# Patient Record
Sex: FEMALE | Race: OTHER | Employment: FULL TIME | URBAN - METROPOLITAN AREA
[De-identification: names, ages, dates, MRNs, and addresses within clinical notes are randomized per-mention and may not be internally consistent; named-entity substitution may affect disease eponyms.]

---

## 2019-08-16 ENCOUNTER — OFFICE VISIT (OUTPATIENT)
Dept: INTERNAL MEDICINE CLINIC | Facility: CLINIC | Age: 39
End: 2019-08-16
Payer: COMMERCIAL

## 2019-08-16 ENCOUNTER — LAB ENCOUNTER (OUTPATIENT)
Dept: LAB | Facility: HOSPITAL | Age: 39
End: 2019-08-16
Attending: NURSE PRACTITIONER
Payer: COMMERCIAL

## 2019-08-16 VITALS
SYSTOLIC BLOOD PRESSURE: 139 MMHG | BODY MASS INDEX: 30.6 KG/M2 | HEART RATE: 69 BPM | HEIGHT: 65 IN | DIASTOLIC BLOOD PRESSURE: 87 MMHG | WEIGHT: 183.69 LBS

## 2019-08-16 DIAGNOSIS — Z00.00 ANNUAL PHYSICAL EXAM: Primary | ICD-10-CM

## 2019-08-16 DIAGNOSIS — E55.9 VITAMIN D DEFICIENCY: ICD-10-CM

## 2019-08-16 DIAGNOSIS — Z00.00 ANNUAL PHYSICAL EXAM: ICD-10-CM

## 2019-08-16 DIAGNOSIS — J34.89 SINUS PRESSURE: ICD-10-CM

## 2019-08-16 LAB
25(OH)D3 SERPL-MCNC: 15.4 NG/ML (ref 30–100)
ALBUMIN SERPL-MCNC: 3.7 G/DL (ref 3.4–5)
ALBUMIN/GLOB SERPL: 0.8 {RATIO} (ref 1–2)
ALP LIVER SERPL-CCNC: 92 U/L (ref 37–98)
ALT SERPL-CCNC: 17 U/L (ref 13–56)
ANION GAP SERPL CALC-SCNC: 9 MMOL/L (ref 0–18)
AST SERPL-CCNC: 17 U/L (ref 15–37)
BASOPHILS # BLD AUTO: 0.03 X10(3) UL (ref 0–0.2)
BASOPHILS NFR BLD AUTO: 0.3 %
BILIRUB SERPL-MCNC: 0.3 MG/DL (ref 0.1–2)
BUN BLD-MCNC: 9 MG/DL (ref 7–18)
BUN/CREAT SERPL: 12.3 (ref 10–20)
CALCIUM BLD-MCNC: 8.5 MG/DL (ref 8.5–10.1)
CHLORIDE SERPL-SCNC: 105 MMOL/L (ref 98–112)
CHOLEST SMN-MCNC: 197 MG/DL (ref ?–200)
CO2 SERPL-SCNC: 25 MMOL/L (ref 21–32)
CREAT BLD-MCNC: 0.73 MG/DL (ref 0.55–1.02)
DEPRECATED RDW RBC AUTO: 44.9 FL (ref 35.1–46.3)
EOSINOPHIL # BLD AUTO: 0.34 X10(3) UL (ref 0–0.7)
EOSINOPHIL NFR BLD AUTO: 3.8 %
ERYTHROCYTE [DISTWIDTH] IN BLOOD BY AUTOMATED COUNT: 14.6 % (ref 11–15)
EST. AVERAGE GLUCOSE BLD GHB EST-MCNC: 111 MG/DL (ref 68–126)
GLOBULIN PLAS-MCNC: 4.4 G/DL (ref 2.8–4.4)
GLUCOSE BLD-MCNC: 90 MG/DL (ref 70–99)
HBA1C MFR BLD HPLC: 5.5 % (ref ?–5.7)
HCT VFR BLD AUTO: 41.3 % (ref 35–48)
HDLC SERPL-MCNC: 22 MG/DL (ref 40–59)
HGB BLD-MCNC: 13.2 G/DL (ref 12–16)
IMM GRANULOCYTES # BLD AUTO: 0.03 X10(3) UL (ref 0–1)
IMM GRANULOCYTES NFR BLD: 0.3 %
LDLC SERPL CALC-MCNC: 122 MG/DL (ref ?–100)
LYMPHOCYTES # BLD AUTO: 3.04 X10(3) UL (ref 1–4)
LYMPHOCYTES NFR BLD AUTO: 34.1 %
M PROTEIN MFR SERPL ELPH: 8.1 G/DL (ref 6.4–8.2)
MCH RBC QN AUTO: 27.2 PG (ref 26–34)
MCHC RBC AUTO-ENTMCNC: 32 G/DL (ref 31–37)
MCV RBC AUTO: 85 FL (ref 80–100)
MONOCYTES # BLD AUTO: 0.62 X10(3) UL (ref 0.1–1)
MONOCYTES NFR BLD AUTO: 7 %
NEUTROPHILS # BLD AUTO: 4.85 X10 (3) UL (ref 1.5–7.7)
NEUTROPHILS # BLD AUTO: 4.85 X10(3) UL (ref 1.5–7.7)
NEUTROPHILS NFR BLD AUTO: 54.5 %
NONHDLC SERPL-MCNC: 175 MG/DL (ref ?–130)
OSMOLALITY SERPL CALC.SUM OF ELEC: 286 MOSM/KG (ref 275–295)
PATIENT FASTING: YES
PATIENT FASTING: YES
PLATELET # BLD AUTO: 314 10(3)UL (ref 150–450)
POTASSIUM SERPL-SCNC: 3.9 MMOL/L (ref 3.5–5.1)
RBC # BLD AUTO: 4.86 X10(6)UL (ref 3.8–5.3)
SODIUM SERPL-SCNC: 139 MMOL/L (ref 136–145)
TRIGL SERPL-MCNC: 266 MG/DL (ref 30–149)
TSI SER-ACNC: 2.38 MIU/ML (ref 0.36–3.74)
VLDLC SERPL CALC-MCNC: 53 MG/DL (ref 0–30)
WBC # BLD AUTO: 8.9 X10(3) UL (ref 4–11)

## 2019-08-16 PROCEDURE — 36415 COLL VENOUS BLD VENIPUNCTURE: CPT

## 2019-08-16 PROCEDURE — 82306 VITAMIN D 25 HYDROXY: CPT

## 2019-08-16 PROCEDURE — 85025 COMPLETE CBC W/AUTO DIFF WBC: CPT

## 2019-08-16 PROCEDURE — 83036 HEMOGLOBIN GLYCOSYLATED A1C: CPT

## 2019-08-16 PROCEDURE — 80061 LIPID PANEL: CPT

## 2019-08-16 PROCEDURE — 99385 PREV VISIT NEW AGE 18-39: CPT | Performed by: NURSE PRACTITIONER

## 2019-08-16 PROCEDURE — 80053 COMPREHEN METABOLIC PANEL: CPT

## 2019-08-16 PROCEDURE — 84443 ASSAY THYROID STIM HORMONE: CPT

## 2019-08-16 NOTE — ASSESSMENT & PLAN NOTE
Mild Sinus Pressure on exam.  Discussed high allergy season  1) Push fluids  2) Zyretic 10 mg po daily

## 2019-08-16 NOTE — ASSESSMENT & PLAN NOTE
A/P-Here for annual physical.  She has a mild tenderness over her maxillary sinuses it is high allergy season and we will try Zyrtec 10 mg daily. She is healthy but has not seen a gynecologist also since her daughter's birth which was 5 years ago.   Is due

## 2019-08-16 NOTE — PROGRESS NOTES
HPI:    Patient ID: Liana Ornelas is a 44year old female. LMP-August 7th. Not sexualyl active. Last time she had sex was 4 years ago. HPI  Patient here for physical exam.  She has not seen a physician in a while.   Her daughter is 11years old and s Tab Take 1 tablet by mouth every 7 days. Disp: 12 tablet Rfl: 1     Allergies:No Known Allergies   PHYSICAL EXAM:   Physical Exam    Constitutional: She is oriented to person, place, and time. She appears well-developed and well-nourished.    HENT:   Head: No follow-ups on file.     Orders Placed This Encounter      CBC diff      CMP      Lipid Panel [E]      TSH reflex      Vitamin D, 25-Hydroxy [E]      Hemoglobin A1C [E]      Meds This Visit:  Requested Prescriptions     Signed Prescriptions Disp Ref

## 2019-08-16 NOTE — ASSESSMENT & PLAN NOTE
Vitamin D Deficiency- Lab drawn today Vitamin D 15.4.  Patient called with Results  1) Vitamin D 50,000 units once a week for 12 weeks  2) Repeat Vitamin D level in 3 months

## 2019-08-21 NOTE — PROGRESS NOTES
Zachary Vicki    Your Vitamin D level is low. Prescription has been sent for Vitamin D 50,000 units once a week for 12 weeks  Hemoglobin A 1 C  Monitors glucose level over the past 3 months. This is normal.  Cholesterol is under 200. This is good.   HD

## 2019-09-09 ENCOUNTER — LAB ENCOUNTER (OUTPATIENT)
Dept: LAB | Facility: HOSPITAL | Age: 39
End: 2019-09-09
Attending: NURSE PRACTITIONER
Payer: COMMERCIAL

## 2019-09-09 DIAGNOSIS — Z00.00 ROUTINE GENERAL MEDICAL EXAMINATION AT A HEALTH CARE FACILITY: Primary | ICD-10-CM

## 2019-09-09 PROCEDURE — 93005 ELECTROCARDIOGRAM TRACING: CPT

## 2019-09-09 PROCEDURE — 93010 ELECTROCARDIOGRAM REPORT: CPT | Performed by: NURSE PRACTITIONER

## 2019-09-11 ENCOUNTER — TELEPHONE (OUTPATIENT)
Dept: INTERNAL MEDICINE CLINIC | Facility: CLINIC | Age: 39
End: 2019-09-11

## 2019-09-25 ENCOUNTER — TELEPHONE (OUTPATIENT)
Dept: INTERNAL MEDICINE CLINIC | Facility: CLINIC | Age: 39
End: 2019-09-25

## 2019-09-25 NOTE — TELEPHONE ENCOUNTER
Spoke with patient ( verified) and reports no relief from Zyrtec as ordered by CECILIA Medrano at 19 OV. \"It doesn't really feel like sinus or allergies. It's more like a migraine.  When the headaches come, they're really, really strong--I get dizzy--t

## 2019-09-25 NOTE — TELEPHONE ENCOUNTER
Patient called in stating that she was prescribed this medication below for headache. She did not get relief from the headaches while taking the medication. She is currently out of the medication and isn't sure if she needs a refill. Please advise.

## 2019-09-26 NOTE — TELEPHONE ENCOUNTER
Instruct her to take Excedrin Migraine two tabs x1 in 24 hours. If headaches not resolved please make OV.

## 2019-10-04 ENCOUNTER — OFFICE VISIT (OUTPATIENT)
Dept: OBGYN CLINIC | Facility: CLINIC | Age: 39
End: 2019-10-04
Payer: COMMERCIAL

## 2019-10-04 VITALS
SYSTOLIC BLOOD PRESSURE: 142 MMHG | HEART RATE: 111 BPM | DIASTOLIC BLOOD PRESSURE: 96 MMHG | BODY MASS INDEX: 32 KG/M2 | WEIGHT: 190.38 LBS

## 2019-10-04 DIAGNOSIS — Z12.31 ENCOUNTER FOR SCREENING MAMMOGRAM FOR BREAST CANCER: ICD-10-CM

## 2019-10-04 DIAGNOSIS — Z80.3 FAMILY HISTORY OF BREAST CANCER: ICD-10-CM

## 2019-10-04 DIAGNOSIS — Z01.419 ENCOUNTER FOR GYNECOLOGICAL EXAMINATION WITHOUT ABNORMAL FINDING: Primary | ICD-10-CM

## 2019-10-04 DIAGNOSIS — Z12.4 CERVICAL CANCER SCREENING: ICD-10-CM

## 2019-10-04 PROCEDURE — 99385 PREV VISIT NEW AGE 18-39: CPT | Performed by: OBSTETRICS & GYNECOLOGY

## 2019-10-04 NOTE — PROGRESS NOTES
Well Woman Exam    HPI:  The patient is a 42yo female who presents today for annual exam. She has no complaints at this time. She is not using contraception and does not desire anything at this time. Her sister was diagnosed in her 35s with breast cancer. Emotionally abused: Not on file        Physically abused: Not on file        Forced sexual activity: Not on file    Other Topics      Concerns:        Not on file    Social History Narrative      Not on file      FAMILY HISTORY:  Family History   Problem R appearance without lesions, no abnormal discharge  Cervix:  Normal without tenderness on motion  Uterus: normal in size, contour, position, mobility, without tenderness  Adnexa: normal without masses or tenderness  Perineum: normal    Assessment/Plan:  1.

## 2020-04-20 ENCOUNTER — NURSE TRIAGE (OUTPATIENT)
Dept: INTERNAL MEDICINE CLINIC | Facility: CLINIC | Age: 40
End: 2020-04-20

## 2020-04-20 ENCOUNTER — VIRTUAL PHONE E/M (OUTPATIENT)
Dept: INTERNAL MEDICINE CLINIC | Facility: CLINIC | Age: 40
End: 2020-04-20
Payer: COMMERCIAL

## 2020-04-20 DIAGNOSIS — G43.C0 PERIODIC HEADACHE SYNDROME, NOT INTRACTABLE: ICD-10-CM

## 2020-04-20 DIAGNOSIS — R21 RASH: Primary | ICD-10-CM

## 2020-04-20 PROCEDURE — 99213 OFFICE O/P EST LOW 20 MIN: CPT | Performed by: NURSE PRACTITIONER

## 2020-04-20 RX ORDER — SUMATRIPTAN 25 MG/1
25 TABLET, FILM COATED ORAL EVERY 2 HOUR PRN
Qty: 9 TABLET | Refills: 1 | Status: SHIPPED | OUTPATIENT
Start: 2020-04-20

## 2020-04-20 NOTE — TELEPHONE ENCOUNTER
Action Requested: Summary for Provider     []  Critical Lab, Recommendations Needed  [x] Need Additional Advice  []   FYI    []   Need Orders  [] Need Medications Sent to Pharmacy  []  Other     SUMMARY: Patient has had a red, raised rash on her neck, ches

## 2020-04-20 NOTE — PROGRESS NOTES
HPI:    Patient ID: Jaleesa Wahl is a 36year old female. HPI Presents with Rash (ITCHY)  36year old female with complains of itchy rash on her chest,back, and legs. The rash started on Friday and it is very itchy.  Small red, raised papules scatt Current Outpatient Medications   Medication Sig Dispense Refill   • hydrocortisone 2.5 % External Cream Apply 1 Application topically 2 (two) times daily.  20 g 0   • SUMAtriptan Succinate (IMITREX) 25 MG Oral Tab Take 1 tablet (25 mg total) by mouth every A/P-36year-old female who I saw her in August for migraine headaches continues to periodically have these headaches. . She currently does not have 1 at the moment but when she gets them light and noise bothers her.   She has to lay down in a dark room and

## 2020-04-21 NOTE — ASSESSMENT & PLAN NOTE
A/P-36year-old female who I saw her in August for migraine headaches. She continues to periodically have these headaches. . She currently does not have 1 at the moment but when she gets them light and noise bothers her.   She has to lay down in a dark room

## 2020-04-21 NOTE — ASSESSMENT & PLAN NOTE
A/P 70-year-old female who I had seen last August for migraine headaches. She called today because she had a rash that erupted on Friday evening. It is very itchy and there are dry raised 1 mm papules on her chest, back and arms.     I was able to do a vi

## 2020-04-23 ENCOUNTER — TELEPHONE (OUTPATIENT)
Dept: INTERNAL MEDICINE CLINIC | Facility: CLINIC | Age: 40
End: 2020-04-23

## 2020-04-23 NOTE — TELEPHONE ENCOUNTER
Please advise on next step:      The patient had a virtual call on 4/20/20. The patient stated the rash is now her hands, a couple on her legs, back and chest.  It is spreading. The skin feels rough and is falling off.   It starts a little red and get

## 2020-04-23 NOTE — TELEPHONE ENCOUNTER
Follow call. Patient with headache and rash. She now has more eruptions on her chest and under her breast. She was able to show me via video. The rash on her breast looks like urticaria with wheals. She is still having papules on arms and legs.  She was he

## 2020-04-24 ENCOUNTER — TELEPHONE (OUTPATIENT)
Dept: INTERNAL MEDICINE CLINIC | Facility: CLINIC | Age: 40
End: 2020-04-24

## 2020-04-24 RX ORDER — DOXYCYCLINE HYCLATE 100 MG/1
100 CAPSULE ORAL 2 TIMES DAILY
Qty: 14 CAPSULE | Refills: 0 | Status: SHIPPED | OUTPATIENT
Start: 2020-04-24

## 2020-04-24 NOTE — TELEPHONE ENCOUNTER
Please see pharmacy message below;  Rx pended for capsules, for review/approval (with note explaining to pharmacy is to replace tablet Rx)

## 2020-04-24 NOTE — TELEPHONE ENCOUNTER
Pharmacy states patient's insurance does not cover medication below but will cover if medication is in capsules. Pharmacy requesting a prescription change.     Doxycycline Hyclate 100 MG Oral Tab EC 14 tablet 0 4/23/2020 4/30/2020   Sig:   Take 1 tablet (10

## 2020-04-26 ENCOUNTER — TELEPHONE (OUTPATIENT)
Dept: INTERNAL MEDICINE CLINIC | Facility: CLINIC | Age: 40
End: 2020-04-26

## 2020-04-26 NOTE — TELEPHONE ENCOUNTER
Lorene Moctezuma    Follow up phone call. Patient states the rash looks less red. It still itches. May take a benadryl 25 mg  before she goes to bed. Patient to follow up on Tuesday.     Hailee Garcia, ANP

## 2020-04-30 ENCOUNTER — TELEPHONE (OUTPATIENT)
Dept: INTERNAL MEDICINE CLINIC | Facility: CLINIC | Age: 40
End: 2020-04-30

## 2020-04-30 DIAGNOSIS — R21 RASH: Primary | ICD-10-CM

## 2020-04-30 RX ORDER — HYDROXYZINE HYDROCHLORIDE 25 MG/1
25 TABLET, FILM COATED ORAL 3 TIMES DAILY PRN
Qty: 30 TABLET | Refills: 0 | Status: SHIPPED | OUTPATIENT
Start: 2020-04-30 | End: 2020-05-10

## 2020-05-01 NOTE — TELEPHONE ENCOUNTER
Follow up on her rash she is getting  More small itchy raise papulues on her legs back and arms. Plan  1) Patient to apply Permethrin topical lotion all over her skin at night and repeat in 14 days.   2) Hydroxyzine 25mg every 8 hours prn for pruritus

## 2020-05-01 NOTE — TELEPHONE ENCOUNTER
Virtual Telephone Check-In    Vic Bush verbally consents to a Virtual/Telephone Check-In visit on 04/30/20. Patient understands and accepts financial responsibility for deductible, co-insurance and/or co-pays associated with this service. any

## 2020-05-03 NOTE — PROGRESS NOTES
HPI:    Patient ID: Daiana Mueller is a 36year old female. HPI Presents with Rash (ITCHY)  36year old female with complains of itchy rash on her chest,back, and legs. The rash started on Friday and it is very itchy.  Small red, raised papules scatt Current Outpatient Medications   Medication Sig Dispense Refill   • hydrocortisone 2.5 % External Cream Apply 1 Application topically 2 (two) times daily.  20 g 0   • SUMAtriptan Succinate (IMITREX) 25 MG Oral Tab Take 1 tablet (25 mg total) by mouth every A/P 51-year-old female who I had seen last August for migraine headaches. She called today because she had a rash that erupted on Friday evening. It is very itchy and there are dry raised 1 mm papules on her chest, back and arms.     I was able to do a

## 2020-05-04 ENCOUNTER — TELEPHONE (OUTPATIENT)
Dept: INTERNAL MEDICINE CLINIC | Facility: CLINIC | Age: 40
End: 2020-05-04

## 2020-05-04 NOTE — TELEPHONE ENCOUNTER
Advised pharmacy that 1% was preferred but if not available, then 5% prescription could be given. Pharm states that they have the 1%. Will notify the pt.

## 2020-05-04 NOTE — TELEPHONE ENCOUNTER
Pharm asking for clarification on permethrin. Do you want the 5% cream or the 1%solution. If you want the 5%cream that is a prescription. The 1% solution is OTC. Please advise. 1% solution. She could not find it over the counter.   So if it is not a

## 2020-05-06 ENCOUNTER — TELEMEDICINE (OUTPATIENT)
Dept: INTERNAL MEDICINE CLINIC | Facility: CLINIC | Age: 40
End: 2020-05-06
Payer: COMMERCIAL

## 2020-05-06 DIAGNOSIS — R21 RASH: Primary | ICD-10-CM

## 2020-05-06 PROCEDURE — 99213 OFFICE O/P EST LOW 20 MIN: CPT | Performed by: NURSE PRACTITIONER

## 2020-05-06 RX ORDER — PERMETHRIN 50 MG/G
1 CREAM TOPICAL ONCE
Qty: 1 BOTTLE | Refills: 1 | Status: SHIPPED | OUTPATIENT
Start: 2020-05-06 | End: 2020-05-06

## 2020-05-06 NOTE — PROGRESS NOTES
HPI:    Patient ID: Roger Ruff is a 36year old female. Please note that the following visit was completed using two-way, real-time interactive audio and/or video communication.   This has been done in good josé miguel to provide continuity of care in th Psychiatric/Behavioral: The patient is not nervous/anxious. Current Outpatient Medications   Medication Sig Dispense Refill   • permethrin 5 % External Cream Apply 1 Application topically once for 1 dose.  1 Bottle 1   • hydrOXYzine HCl 25 MG Or A/P 80-year-old female who I recently saw for rash on her back breast and back legs and upper arms. It almost look like some type of bites but she had not had any travel.   Patient was originally instructed to take Benadryl 25 mg at at bedtime and apply

## 2020-05-06 NOTE — ASSESSMENT & PLAN NOTE
A/P 29-year-old female who I recently saw for rash on her back breast and back legs and upper arms. It almost look like some type of bites but she had not had any travel.   Patient was originally instructed to take Benadryl 25 mg at at bedtime and apply hy

## 2020-05-20 ENCOUNTER — TELEPHONE (OUTPATIENT)
Dept: INTERNAL MEDICINE CLINIC | Facility: CLINIC | Age: 40
End: 2020-05-20

## 2020-05-20 NOTE — TELEPHONE ENCOUNTER
Jevon Hitchcock    Patient called to follow up on rash. Now answer. Mailbox unavailable.     Dolly Rivera, RADHA

## 2021-01-04 ENCOUNTER — TELEPHONE (OUTPATIENT)
Dept: INTERNAL MEDICINE CLINIC | Facility: CLINIC | Age: 41
End: 2021-01-04

## 2021-01-04 DIAGNOSIS — Z12.31 ENCOUNTER FOR SCREENING MAMMOGRAM FOR MALIGNANT NEOPLASM OF BREAST: Primary | ICD-10-CM

## 2021-01-04 NOTE — TELEPHONE ENCOUNTER
Sharyn calling reports patient needs to heave her mammogram order placed as it it is  and pt would like to get it done    Please advise and thank you.

## 2021-01-05 NOTE — TELEPHONE ENCOUNTER
Spoke with patient, (  verified ) informed that Mammogram order has  been placed   She will make the appt

## 2021-01-20 ENCOUNTER — HOSPITAL ENCOUNTER (OUTPATIENT)
Dept: MAMMOGRAPHY | Facility: HOSPITAL | Age: 41
Discharge: HOME | End: 2021-01-20
Attending: NURSE PRACTITIONER
Payer: COMMERCIAL

## 2021-01-20 DIAGNOSIS — Z12.31 ENCOUNTER FOR SCREENING MAMMOGRAM FOR MALIGNANT NEOPLASM OF BREAST: ICD-10-CM

## 2021-01-20 PROCEDURE — 77063 BREAST TOMOSYNTHESIS BI: CPT | Performed by: NURSE PRACTITIONER

## 2021-01-20 PROCEDURE — 77067 SCR MAMMO BI INCL CAD: CPT | Performed by: NURSE PRACTITIONER

## 2021-01-21 ENCOUNTER — TELEPHONE (OUTPATIENT)
Dept: INTERNAL MEDICINE CLINIC | Facility: CLINIC | Age: 41
End: 2021-01-21

## 2021-01-21 DIAGNOSIS — R92.8 ABNORMAL MAMMOGRAM: Primary | ICD-10-CM

## 2021-01-21 NOTE — TELEPHONE ENCOUNTER
Dahiana Obregon, ultrasound has been pended. Diagnostic additional views has been already generated.     =====  CONCLUSION:  There is a possible asymmetry in the upper central right breast.  Dedicated right breast imaging is recommended for complete evaluation.

## 2021-02-04 ENCOUNTER — HOSPITAL ENCOUNTER (OUTPATIENT)
Dept: ULTRASOUND IMAGING | Facility: HOSPITAL | Age: 41
Discharge: HOME | End: 2021-02-04
Attending: NURSE PRACTITIONER
Payer: COMMERCIAL

## 2021-02-04 ENCOUNTER — HOSPITAL ENCOUNTER (OUTPATIENT)
Dept: MAMMOGRAPHY | Facility: HOSPITAL | Age: 41
Discharge: HOME | End: 2021-02-04
Attending: NURSE PRACTITIONER
Payer: COMMERCIAL

## 2021-02-04 DIAGNOSIS — R92.8 ABNORMAL MAMMOGRAM OF RIGHT BREAST: Primary | ICD-10-CM

## 2021-02-04 DIAGNOSIS — R92.8 ABNORMAL MAMMOGRAM: ICD-10-CM

## 2021-02-04 PROCEDURE — 77065 DX MAMMO INCL CAD UNI: CPT | Performed by: NURSE PRACTITIONER

## 2021-02-04 PROCEDURE — 77061 BREAST TOMOSYNTHESIS UNI: CPT | Performed by: NURSE PRACTITIONER

## 2021-02-04 PROCEDURE — 76642 ULTRASOUND BREAST LIMITED: CPT | Performed by: NURSE PRACTITIONER

## 2021-02-04 NOTE — PROGRESS NOTES
Please call patient. I posted results to [employer]. Please inform patient to schedule a right mammogram in August for close follow up. The order has been placed.     Danii Smith ANP

## 2021-03-15 ENCOUNTER — HOSPITAL ENCOUNTER (EMERGENCY)
Facility: HOSPITAL | Age: 41
Discharge: HOME | End: 2021-03-16
Attending: EMERGENCY MEDICINE
Payer: COMMERCIAL

## 2021-03-15 ENCOUNTER — NURSE TRIAGE (OUTPATIENT)
Dept: INTERNAL MEDICINE CLINIC | Facility: CLINIC | Age: 41
End: 2021-03-15

## 2021-03-15 VITALS
TEMPERATURE: 99 F | OXYGEN SATURATION: 99 % | RESPIRATION RATE: 18 BRPM | HEIGHT: 63 IN | DIASTOLIC BLOOD PRESSURE: 89 MMHG | HEART RATE: 72 BPM | WEIGHT: 180 LBS | BODY MASS INDEX: 31.89 KG/M2 | SYSTOLIC BLOOD PRESSURE: 145 MMHG

## 2021-03-15 DIAGNOSIS — M54.9 BACK PAIN WITHOUT RADIATION: ICD-10-CM

## 2021-03-15 DIAGNOSIS — R10.9 ABDOMINAL PAIN OF UNKNOWN ETIOLOGY: Primary | ICD-10-CM

## 2021-03-15 LAB
ANION GAP SERPL CALC-SCNC: 6 MMOL/L (ref 0–18)
BASOPHILS # BLD AUTO: 0.03 X10(3) UL (ref 0–0.2)
BASOPHILS NFR BLD AUTO: 0.2 %
BILIRUB UR QL: NEGATIVE
BUN BLD-MCNC: 13 MG/DL (ref 7–18)
BUN/CREAT SERPL: 17.6 (ref 10–20)
CALCIUM BLD-MCNC: 8.4 MG/DL (ref 8.5–10.1)
CHLORIDE SERPL-SCNC: 104 MMOL/L (ref 98–112)
CLARITY UR: CLEAR
CO2 SERPL-SCNC: 27 MMOL/L (ref 21–32)
COLOR UR: YELLOW
CREAT BLD-MCNC: 0.74 MG/DL
DEPRECATED RDW RBC AUTO: 45.9 FL (ref 35.1–46.3)
EOSINOPHIL # BLD AUTO: 0.27 X10(3) UL (ref 0–0.7)
EOSINOPHIL NFR BLD AUTO: 2.2 %
ERYTHROCYTE [DISTWIDTH] IN BLOOD BY AUTOMATED COUNT: 17.4 % (ref 11–15)
GLUCOSE BLD-MCNC: 95 MG/DL (ref 70–99)
GLUCOSE UR-MCNC: NEGATIVE MG/DL
HCT VFR BLD AUTO: 36.1 %
HGB BLD-MCNC: 11.2 G/DL
HGB UR QL STRIP.AUTO: NEGATIVE
IMM GRANULOCYTES # BLD AUTO: 0.04 X10(3) UL (ref 0–1)
IMM GRANULOCYTES NFR BLD: 0.3 %
KETONES UR-MCNC: NEGATIVE MG/DL
LEUKOCYTE ESTERASE UR QL STRIP.AUTO: NEGATIVE
LYMPHOCYTES # BLD AUTO: 3.94 X10(3) UL (ref 1–4)
LYMPHOCYTES NFR BLD AUTO: 32.5 %
MCH RBC QN AUTO: 23 PG (ref 26–34)
MCHC RBC AUTO-ENTMCNC: 31 G/DL (ref 31–37)
MCV RBC AUTO: 74.3 FL
MONOCYTES # BLD AUTO: 0.85 X10(3) UL (ref 0.1–1)
MONOCYTES NFR BLD AUTO: 7 %
NEUTROPHILS # BLD AUTO: 7 X10 (3) UL (ref 1.5–7.7)
NEUTROPHILS # BLD AUTO: 7 X10(3) UL (ref 1.5–7.7)
NEUTROPHILS NFR BLD AUTO: 57.8 %
NITRITE UR QL STRIP.AUTO: NEGATIVE
OSMOLALITY SERPL CALC.SUM OF ELEC: 284 MOSM/KG (ref 275–295)
PH UR: 6 [PH] (ref 5–8)
PLATELET # BLD AUTO: 376 10(3)UL (ref 150–450)
POTASSIUM SERPL-SCNC: 3.2 MMOL/L (ref 3.5–5.1)
PROT UR-MCNC: NEGATIVE MG/DL
RBC # BLD AUTO: 4.86 X10(6)UL
SODIUM SERPL-SCNC: 137 MMOL/L (ref 136–145)
SP GR UR STRIP: 1.01 (ref 1–1.03)
UROBILINOGEN UR STRIP-ACNC: <2
WBC # BLD AUTO: 12.1 X10(3) UL (ref 4–11)

## 2021-03-15 PROCEDURE — 81003 URINALYSIS AUTO W/O SCOPE: CPT | Performed by: EMERGENCY MEDICINE

## 2021-03-15 PROCEDURE — 96361 HYDRATE IV INFUSION ADD-ON: CPT

## 2021-03-15 PROCEDURE — 80048 BASIC METABOLIC PNL TOTAL CA: CPT | Performed by: EMERGENCY MEDICINE

## 2021-03-15 PROCEDURE — 99284 EMERGENCY DEPT VISIT MOD MDM: CPT

## 2021-03-15 PROCEDURE — 96374 THER/PROPH/DIAG INJ IV PUSH: CPT

## 2021-03-15 PROCEDURE — 96375 TX/PRO/DX INJ NEW DRUG ADDON: CPT

## 2021-03-15 PROCEDURE — 85025 COMPLETE CBC W/AUTO DIFF WBC: CPT | Performed by: EMERGENCY MEDICINE

## 2021-03-15 RX ORDER — KETOROLAC TROMETHAMINE 10 MG/1
10 TABLET, FILM COATED ORAL EVERY 6 HOURS PRN
Qty: 20 TABLET | Refills: 0 | Status: SHIPPED | OUTPATIENT
Start: 2021-03-15 | End: 2021-03-22

## 2021-03-15 RX ORDER — ONDANSETRON 2 MG/ML
4 INJECTION INTRAMUSCULAR; INTRAVENOUS ONCE
Status: COMPLETED | OUTPATIENT
Start: 2021-03-15 | End: 2021-03-15

## 2021-03-15 RX ORDER — KETOROLAC TROMETHAMINE 30 MG/ML
30 INJECTION, SOLUTION INTRAMUSCULAR; INTRAVENOUS ONCE
Status: COMPLETED | OUTPATIENT
Start: 2021-03-15 | End: 2021-03-15

## 2021-03-15 NOTE — TELEPHONE ENCOUNTER
Action Requested: Summary for Provider     []  Critical Lab, Recommendations Needed  [] Need Additional Advice  []   FYI    []   Need Orders  [] Need Medications Sent to Pharmacy  []  Other     SUMMARY: c/o intermittent chest pain that started last week, t

## 2021-03-15 NOTE — TELEPHONE ENCOUNTER
Spoke with pt,  verified  Pt informed of JANET TANNER recommendation, pt stated understanding and her  will take her to [place] [facility].          FERNIE

## 2021-03-16 NOTE — ED PROVIDER NOTES
Patient Seen in: [facility] AND [facility] Emergency Department      History   Patient presents with:  Abdomen/Flank Pain    Stated Complaint: abd pain and low back pain    HPI/Subjective:   HPI    31-year-old female with no significant past medical history here (!) 180/94   Pulse 85   Temp 98.9 °F (37.2 °C) (Oral)   Resp 18   Ht 160 cm (5' 3\")   Wt 81.6 kg   LMP 03/02/2021   SpO2 99%   BMI 31.89 kg/m²         Physical Exam  Vitals and nursing note reviewed. HENT:      Head: Normocephalic.       Mouth/Throat: 46.3 fL    RDW 17.4 (H) 11.0 - 15.0 %    .0 150.0 - 450.0 10(3)uL    Neutrophil Absolute Prelim 7.00 1.50 - 7.70 x10 (3) uL    Neutrophil Absolute 7.00 1.50 - 7.70 x10(3) uL    Lymphocyte Absolute 3.94 1.00 - 4.00 x10(3) uL    Monocyte Absolute 0.85 have discussed the risks and benefits of CT which included missing potentially life threatening pathology vs radiation exposure and its increased risk of cancer and other unknown outcomes.   The patient/parent has made the informed decision to not have a CT Medication List    START taking these medications    Ketorolac Tromethamine 10 MG Oral Tab  Take 1 tablet (10 mg total) by mouth every 6 (six) hours as needed.   Qty: 20 tablet Refills: 0

## 2021-07-10 NOTE — TELEPHONE ENCOUNTER
Patient needs to make a mammogram and U.S appointment for August.    Please call patient and remind. Also send letter to remind patient.     RADHA Monroy  Working with [facility]

## 2021-09-29 ENCOUNTER — TELEPHONE (OUTPATIENT)
Dept: INTERNAL MEDICINE CLINIC | Facility: CLINIC | Age: 41
End: 2021-09-29

## 2021-09-30 NOTE — TELEPHONE ENCOUNTER
Patient called.  Phone disconnected  Needs follow up mammogram    RADHA Pérez  Working with [facility]

## 2021-10-18 ENCOUNTER — TELEPHONE (OUTPATIENT)
Dept: INTERNAL MEDICINE CLINIC | Facility: CLINIC | Age: 41
End: 2021-10-18

## 2022-02-01 ENCOUNTER — TELEPHONE (OUTPATIENT)
Dept: INTERNAL MEDICINE CLINIC | Facility: CLINIC | Age: 42
End: 2022-02-01

## 2022-02-01 NOTE — TELEPHONE ENCOUNTER
Please call patient and send letter. She needs a follow up appointment with me.   Overdue on mammogram.    RADHA Angelo  Working with [facility]

## 2022-02-14 ENCOUNTER — OFFICE VISIT (OUTPATIENT)
Dept: INTERNAL MEDICINE CLINIC | Facility: CLINIC | Age: 42
End: 2022-02-14
Payer: COMMERCIAL

## 2022-02-14 VITALS
BODY MASS INDEX: 35.26 KG/M2 | TEMPERATURE: 98 F | HEART RATE: 84 BPM | HEIGHT: 63 IN | DIASTOLIC BLOOD PRESSURE: 89 MMHG | OXYGEN SATURATION: 99 % | SYSTOLIC BLOOD PRESSURE: 158 MMHG | WEIGHT: 199 LBS

## 2022-02-14 DIAGNOSIS — R92.8 ABNORMALITY OF BREAST ON SCREENING MAMMOGRAPHY: ICD-10-CM

## 2022-02-14 DIAGNOSIS — Z00.00 ANNUAL PHYSICAL EXAM: ICD-10-CM

## 2022-02-14 DIAGNOSIS — E55.9 VITAMIN D DEFICIENCY: ICD-10-CM

## 2022-02-14 DIAGNOSIS — R92.8 ABNORMAL FINDING ON BREAST IMAGING: ICD-10-CM

## 2022-02-14 DIAGNOSIS — G44.221 CHRONIC TENSION-TYPE HEADACHE, INTRACTABLE: ICD-10-CM

## 2022-02-14 DIAGNOSIS — Z12.4 SCREENING FOR CERVICAL CANCER: ICD-10-CM

## 2022-02-14 DIAGNOSIS — E53.8 VITAMIN B 12 DEFICIENCY: ICD-10-CM

## 2022-02-14 DIAGNOSIS — G43.C0 PERIODIC HEADACHE SYNDROME, NOT INTRACTABLE: Primary | ICD-10-CM

## 2022-02-14 DIAGNOSIS — I10 PRIMARY HYPERTENSION: ICD-10-CM

## 2022-02-14 PROBLEM — G44.209 TENSION TYPE HEADACHE: Status: ACTIVE | Noted: 2022-02-14

## 2022-02-14 PROCEDURE — 3079F DIAST BP 80-89 MM HG: CPT | Performed by: NURSE PRACTITIONER

## 2022-02-14 PROCEDURE — 3008F BODY MASS INDEX DOCD: CPT | Performed by: NURSE PRACTITIONER

## 2022-02-14 PROCEDURE — 3077F SYST BP >= 140 MM HG: CPT | Performed by: NURSE PRACTITIONER

## 2022-02-14 PROCEDURE — 99214 OFFICE O/P EST MOD 30 MIN: CPT | Performed by: NURSE PRACTITIONER

## 2022-02-14 RX ORDER — SUMATRIPTAN 25 MG/1
25 TABLET, FILM COATED ORAL EVERY 2 HOUR PRN
Qty: 9 TABLET | Refills: 1 | Status: SHIPPED | OUTPATIENT
Start: 2022-02-14

## 2022-02-14 NOTE — ASSESSMENT & PLAN NOTE
77-year-old female with intermittent chronic headaches relieved by Excedrin. She did wake up with a headache today and this prompted her to come in for an evaluation. She also uses Imitrex 25 mg and that also helps. Requesting refill on this medication. Plan  Sumatriptan 25mg po every two hours x2. Only two in a 24 hour period.

## 2022-02-14 NOTE — ASSESSMENT & PLAN NOTE
Patient's annual physical was done approximately 2 years ago.   We will order her her lab work and instruct her to come back for a full physical.    Plan  Annual labs  Return for full physical

## 2022-02-14 NOTE — ASSESSMENT & PLAN NOTE
49-year-old with ongoing problems of chronic headaches that are improved with Excedrin and imitrix.   Patient states her headache

## 2022-02-15 ENCOUNTER — LAB ENCOUNTER (OUTPATIENT)
Dept: LAB | Facility: HOSPITAL | Age: 42
End: 2022-02-15
Attending: NURSE PRACTITIONER
Payer: COMMERCIAL

## 2022-02-15 DIAGNOSIS — G43.C0 PERIODIC HEADACHE SYNDROME, NOT INTRACTABLE: ICD-10-CM

## 2022-02-15 DIAGNOSIS — E55.9 VITAMIN D DEFICIENCY: ICD-10-CM

## 2022-02-15 DIAGNOSIS — E53.8 VITAMIN B 12 DEFICIENCY: ICD-10-CM

## 2022-02-15 LAB
ALBUMIN SERPL-MCNC: 3.9 G/DL (ref 3.4–5)
ALBUMIN/GLOB SERPL: 1 {RATIO} (ref 1–2)
ALT SERPL-CCNC: 38 U/L
ANION GAP SERPL CALC-SCNC: 9 MMOL/L (ref 0–18)
AST SERPL-CCNC: 35 U/L (ref 15–37)
BASOPHILS # BLD AUTO: 0.05 X10(3) UL (ref 0–0.2)
BASOPHILS NFR BLD AUTO: 0.5 %
BILIRUB SERPL-MCNC: 0.4 MG/DL (ref 0.1–2)
BILIRUB UR QL: NEGATIVE
BUN BLD-MCNC: 6 MG/DL (ref 7–18)
BUN/CREAT SERPL: 9.2 (ref 10–20)
CALCIUM BLD-MCNC: 8.9 MG/DL (ref 8.5–10.1)
CHLORIDE SERPL-SCNC: 102 MMOL/L (ref 98–112)
CHOLEST SERPL-MCNC: 220 MG/DL (ref ?–200)
CO2 SERPL-SCNC: 27 MMOL/L (ref 21–32)
COLOR UR: YELLOW
CREAT BLD-MCNC: 0.65 MG/DL
DEPRECATED RDW RBC AUTO: 53.8 FL (ref 35.1–46.3)
EOSINOPHIL # BLD AUTO: 0.22 X10(3) UL (ref 0–0.7)
EOSINOPHIL NFR BLD AUTO: 2.4 %
ERYTHROCYTE [DISTWIDTH] IN BLOOD BY AUTOMATED COUNT: 19.5 % (ref 11–15)
FASTING PATIENT LIPID ANSWER: YES
FASTING STATUS PATIENT QL REPORTED: YES
GLOBULIN PLAS-MCNC: 4.1 G/DL (ref 2.8–4.4)
GLUCOSE BLD-MCNC: 81 MG/DL (ref 70–99)
GLUCOSE UR-MCNC: NEGATIVE MG/DL
HCT VFR BLD AUTO: 35 %
HDLC SERPL-MCNC: 19 MG/DL (ref 40–59)
HGB BLD-MCNC: 10.5 G/DL
IMM GRANULOCYTES # BLD AUTO: 0.04 X10(3) UL (ref 0–1)
IMM GRANULOCYTES NFR BLD: 0.4 %
LDLC SERPL CALC-MCNC: 152 MG/DL (ref ?–100)
LEUKOCYTE ESTERASE UR QL STRIP.AUTO: NEGATIVE
LYMPHOCYTES # BLD AUTO: 3.5 X10(3) UL (ref 1–4)
LYMPHOCYTES NFR BLD AUTO: 37.7 %
MCH RBC QN AUTO: 22.9 PG (ref 26–34)
MCHC RBC AUTO-ENTMCNC: 30 G/DL (ref 31–37)
MCV RBC AUTO: 76.4 FL
MONOCYTES # BLD AUTO: 0.52 X10(3) UL (ref 0.1–1)
MONOCYTES NFR BLD AUTO: 5.6 %
NEUTROPHILS # BLD AUTO: 4.96 X10 (3) UL (ref 1.5–7.7)
NEUTROPHILS # BLD AUTO: 4.96 X10(3) UL (ref 1.5–7.7)
NEUTROPHILS NFR BLD AUTO: 53.4 %
NITRITE UR QL STRIP.AUTO: NEGATIVE
NONHDLC SERPL-MCNC: 201 MG/DL (ref ?–130)
OSMOLALITY SERPL CALC.SUM OF ELEC: 283 MOSM/KG (ref 275–295)
PH UR: 6 [PH] (ref 5–8)
PLATELET # BLD AUTO: 355 10(3)UL (ref 150–450)
POTASSIUM SERPL-SCNC: 3.5 MMOL/L (ref 3.5–5.1)
PROT SERPL-MCNC: 8 G/DL (ref 6.4–8.2)
PROT UR-MCNC: 30 MG/DL
RBC # BLD AUTO: 4.58 X10(6)UL
RBC #/AREA URNS AUTO: >10 /HPF
SODIUM SERPL-SCNC: 138 MMOL/L (ref 136–145)
SP GR UR STRIP: 1.01 (ref 1–1.03)
TRIGL SERPL-MCNC: 260 MG/DL (ref 30–149)
TSI SER-ACNC: 1.21 MIU/ML (ref 0.36–3.74)
UROBILINOGEN UR STRIP-ACNC: <2
VIT B12 SERPL-MCNC: 417 PG/ML (ref 193–986)
VIT D+METAB SERPL-MCNC: 17.6 NG/ML (ref 30–100)
VLDLC SERPL CALC-MCNC: 51 MG/DL (ref 0–30)
WBC # BLD AUTO: 9.3 X10(3) UL (ref 4–11)

## 2022-02-15 PROCEDURE — 82607 VITAMIN B-12: CPT

## 2022-02-15 PROCEDURE — 85025 COMPLETE CBC W/AUTO DIFF WBC: CPT

## 2022-02-15 PROCEDURE — 82306 VITAMIN D 25 HYDROXY: CPT

## 2022-02-15 PROCEDURE — 36415 COLL VENOUS BLD VENIPUNCTURE: CPT

## 2022-02-15 PROCEDURE — 80053 COMPREHEN METABOLIC PANEL: CPT

## 2022-02-15 PROCEDURE — 80061 LIPID PANEL: CPT

## 2022-02-15 PROCEDURE — 81001 URINALYSIS AUTO W/SCOPE: CPT

## 2022-02-15 PROCEDURE — 84443 ASSAY THYROID STIM HORMONE: CPT

## 2022-02-22 ENCOUNTER — TELEPHONE (OUTPATIENT)
Dept: INTERNAL MEDICINE CLINIC | Facility: CLINIC | Age: 42
End: 2022-02-22

## 2022-02-22 RX ORDER — ATORVASTATIN CALCIUM 10 MG/1
10 TABLET, FILM COATED ORAL NIGHTLY
Qty: 30 TABLET | Refills: 3 | Status: SHIPPED | OUTPATIENT
Start: 2022-02-22

## 2022-02-22 RX ORDER — ERGOCALCIFEROL 1.25 MG/1
50000 CAPSULE ORAL WEEKLY
Qty: 12 CAPSULE | Refills: 0 | Status: SHIPPED | OUTPATIENT
Start: 2022-02-22 | End: 2022-05-11

## 2022-02-22 NOTE — TELEPHONE ENCOUNTER
Action Requested: Summary for Provider     []  Critical Lab, Recommendations Needed  [x] Need Additional Advice  []   FYI    []   Need Orders  [] Need Medications Sent to Pharmacy  []  Other     SUMMARY:  Verified name and  of patient. Patient is calling to review the labs done on 2/15/22.      Please advise    Reason for call: Test Results  Onset: Data Unavailable

## 2022-02-24 ENCOUNTER — APPOINTMENT (OUTPATIENT)
Dept: GENERAL RADIOLOGY | Facility: HOSPITAL | Age: 42
End: 2022-02-24
Attending: EMERGENCY MEDICINE
Payer: COMMERCIAL

## 2022-02-24 ENCOUNTER — HOSPITAL ENCOUNTER (EMERGENCY)
Facility: HOSPITAL | Age: 42
Discharge: HOME | End: 2022-02-24
Attending: EMERGENCY MEDICINE
Payer: COMMERCIAL

## 2022-02-24 VITALS
OXYGEN SATURATION: 100 % | RESPIRATION RATE: 18 BRPM | HEART RATE: 92 BPM | WEIGHT: 190 LBS | TEMPERATURE: 98 F | BODY MASS INDEX: 34 KG/M2 | SYSTOLIC BLOOD PRESSURE: 150 MMHG | DIASTOLIC BLOOD PRESSURE: 90 MMHG

## 2022-02-24 DIAGNOSIS — R06.00 DYSPNEA, UNSPECIFIED TYPE: Primary | ICD-10-CM

## 2022-02-24 LAB
ANION GAP SERPL CALC-SCNC: 7 MMOL/L (ref 0–18)
BASOPHILS # BLD AUTO: 0.05 X10(3) UL (ref 0–0.2)
BASOPHILS NFR BLD AUTO: 0.6 %
BUN BLD-MCNC: 7 MG/DL (ref 7–18)
BUN/CREAT SERPL: 8.9 (ref 10–20)
CALCIUM BLD-MCNC: 9.4 MG/DL (ref 8.5–10.1)
CHLORIDE SERPL-SCNC: 106 MMOL/L (ref 98–112)
CO2 SERPL-SCNC: 27 MMOL/L (ref 21–32)
CREAT BLD-MCNC: 0.79 MG/DL
D DIMER PPP FEU-MCNC: 0.33 UG/ML FEU (ref ?–0.5)
DEPRECATED RDW RBC AUTO: 51 FL (ref 35.1–46.3)
EOSINOPHIL # BLD AUTO: 0.23 X10(3) UL (ref 0–0.7)
EOSINOPHIL NFR BLD AUTO: 2.7 %
ERYTHROCYTE [DISTWIDTH] IN BLOOD BY AUTOMATED COUNT: 18.6 % (ref 11–15)
GLUCOSE BLD-MCNC: 110 MG/DL (ref 70–99)
HCT VFR BLD AUTO: 40.4 %
HGB BLD-MCNC: 12 G/DL
IMM GRANULOCYTES # BLD AUTO: 0.03 X10(3) UL (ref 0–1)
IMM GRANULOCYTES NFR BLD: 0.3 %
LYMPHOCYTES # BLD AUTO: 3.17 X10(3) UL (ref 1–4)
MCH RBC QN AUTO: 22.7 PG (ref 26–34)
MCHC RBC AUTO-ENTMCNC: 29.7 G/DL (ref 31–37)
MCV RBC AUTO: 76.4 FL
MONOCYTES # BLD AUTO: 0.57 X10(3) UL (ref 0.1–1)
MONOCYTES NFR BLD AUTO: 6.6 %
NEUTROPHILS # BLD AUTO: 4.62 X10 (3) UL (ref 1.5–7.7)
NEUTROPHILS # BLD AUTO: 4.62 X10(3) UL (ref 1.5–7.7)
NEUTROPHILS NFR BLD AUTO: 53.2 %
PLATELET # BLD AUTO: 381 10(3)UL (ref 150–450)
POTASSIUM SERPL-SCNC: 3.1 MMOL/L (ref 3.5–5.1)
RBC # BLD AUTO: 5.29 X10(6)UL
SARS-COV-2 RNA RESP QL NAA+PROBE: NOT DETECTED
SODIUM SERPL-SCNC: 140 MMOL/L (ref 136–145)
TROPONIN I HIGH SENSITIVITY: 6 NG/L
WBC # BLD AUTO: 8.7 X10(3) UL (ref 4–11)

## 2022-02-24 PROCEDURE — 71045 X-RAY EXAM CHEST 1 VIEW: CPT | Performed by: EMERGENCY MEDICINE

## 2022-02-24 PROCEDURE — 36415 COLL VENOUS BLD VENIPUNCTURE: CPT

## 2022-02-24 PROCEDURE — 84484 ASSAY OF TROPONIN QUANT: CPT | Performed by: EMERGENCY MEDICINE

## 2022-02-24 PROCEDURE — 93010 ELECTROCARDIOGRAM REPORT: CPT | Performed by: EMERGENCY MEDICINE

## 2022-02-24 PROCEDURE — 80048 BASIC METABOLIC PNL TOTAL CA: CPT | Performed by: EMERGENCY MEDICINE

## 2022-02-24 PROCEDURE — 85379 FIBRIN DEGRADATION QUANT: CPT | Performed by: EMERGENCY MEDICINE

## 2022-02-24 PROCEDURE — 99284 EMERGENCY DEPT VISIT MOD MDM: CPT

## 2022-02-24 PROCEDURE — 85025 COMPLETE CBC W/AUTO DIFF WBC: CPT | Performed by: EMERGENCY MEDICINE

## 2022-02-24 PROCEDURE — 93005 ELECTROCARDIOGRAM TRACING: CPT

## 2022-02-24 NOTE — ED INITIAL ASSESSMENT (HPI)
Patient notes shortness of breath x 15 minutes. Notes she feels nervous. Notes she feels anxious. Denies chest pain.

## 2022-03-08 ENCOUNTER — TELEPHONE (OUTPATIENT)
Dept: INTERNAL MEDICINE CLINIC | Facility: CLINIC | Age: 42
End: 2022-03-08

## 2022-03-08 NOTE — TELEPHONE ENCOUNTER
Patient calling stating she missed a call from Meagan Stewart today. Patient stated no VM was left and wants to make sure it wasn't our office. No notes or encounters from our office seen. Patient states it may have been obgyn office reaching out to her to reschedule the appt she cancelled. No further action required.

## 2022-04-04 ENCOUNTER — OFFICE VISIT (OUTPATIENT)
Dept: OBGYN CLINIC | Facility: CLINIC | Age: 42
End: 2022-04-04
Payer: COMMERCIAL

## 2022-04-04 ENCOUNTER — TELEPHONE (OUTPATIENT)
Dept: OBGYN CLINIC | Facility: CLINIC | Age: 42
End: 2022-04-04

## 2022-04-04 VITALS
SYSTOLIC BLOOD PRESSURE: 149 MMHG | HEART RATE: 101 BPM | DIASTOLIC BLOOD PRESSURE: 95 MMHG | BODY MASS INDEX: 35 KG/M2 | WEIGHT: 199.81 LBS

## 2022-04-04 DIAGNOSIS — Z01.419 ENCOUNTER FOR ANNUAL ROUTINE GYNECOLOGICAL EXAMINATION: Primary | ICD-10-CM

## 2022-04-04 DIAGNOSIS — D21.9 FIBROIDS: ICD-10-CM

## 2022-04-04 DIAGNOSIS — N94.6 DYSMENORRHEA: ICD-10-CM

## 2022-04-04 DIAGNOSIS — R03.0 ELEVATED BLOOD PRESSURE READING: ICD-10-CM

## 2022-04-04 PROCEDURE — 3080F DIAST BP >= 90 MM HG: CPT | Performed by: NURSE PRACTITIONER

## 2022-04-04 PROCEDURE — 3077F SYST BP >= 140 MM HG: CPT | Performed by: NURSE PRACTITIONER

## 2022-04-04 PROCEDURE — 99396 PREV VISIT EST AGE 40-64: CPT | Performed by: NURSE PRACTITIONER

## 2022-04-04 NOTE — TELEPHONE ENCOUNTER
Per  EMB, pt needs a Fibroid Consult with either Ana Lazar or Lakshmi Scales in 1 month. Patient prefers Mondays and currently there is no open NP slots. Please contact patient.

## 2022-04-11 ENCOUNTER — OFFICE VISIT (OUTPATIENT)
Dept: INTERNAL MEDICINE CLINIC | Facility: CLINIC | Age: 42
End: 2022-04-11
Payer: COMMERCIAL

## 2022-04-11 VITALS
HEIGHT: 63 IN | WEIGHT: 195.81 LBS | HEART RATE: 111 BPM | BODY MASS INDEX: 34.7 KG/M2 | SYSTOLIC BLOOD PRESSURE: 139 MMHG | DIASTOLIC BLOOD PRESSURE: 86 MMHG

## 2022-04-11 DIAGNOSIS — Z12.31 VISIT FOR SCREENING MAMMOGRAM: Primary | ICD-10-CM

## 2022-04-11 DIAGNOSIS — R92.8 ABNORMALITY OF BREAST ON SCREENING MAMMOGRAPHY: ICD-10-CM

## 2022-04-11 DIAGNOSIS — R91.1 LUNG NODULE: ICD-10-CM

## 2022-04-11 PROCEDURE — 3079F DIAST BP 80-89 MM HG: CPT | Performed by: NURSE PRACTITIONER

## 2022-04-11 PROCEDURE — 3075F SYST BP GE 130 - 139MM HG: CPT | Performed by: NURSE PRACTITIONER

## 2022-04-11 PROCEDURE — 3008F BODY MASS INDEX DOCD: CPT | Performed by: NURSE PRACTITIONER

## 2022-04-11 PROCEDURE — 99214 OFFICE O/P EST MOD 30 MIN: CPT | Performed by: NURSE PRACTITIONER

## 2022-04-11 RX ORDER — ERGOCALCIFEROL 1.25 MG/1
50000 CAPSULE ORAL WEEKLY
Qty: 12 CAPSULE | Refills: 0 | Status: SHIPPED | OUTPATIENT
Start: 2022-04-11 | End: 2022-06-28

## 2022-04-22 ENCOUNTER — HOSPITAL ENCOUNTER (OUTPATIENT)
Dept: ULTRASOUND IMAGING | Facility: HOSPITAL | Age: 42
Discharge: HOME | End: 2022-04-22
Attending: NURSE PRACTITIONER
Payer: COMMERCIAL

## 2022-04-22 DIAGNOSIS — D21.9 FIBROIDS: ICD-10-CM

## 2022-04-22 DIAGNOSIS — N94.6 DYSMENORRHEA: ICD-10-CM

## 2022-04-22 PROCEDURE — 76830 TRANSVAGINAL US NON-OB: CPT | Performed by: NURSE PRACTITIONER

## 2022-04-22 PROCEDURE — 76856 US EXAM PELVIC COMPLETE: CPT | Performed by: NURSE PRACTITIONER

## 2022-04-27 ENCOUNTER — TELEPHONE (OUTPATIENT)
Dept: OBGYN CLINIC | Facility: CLINIC | Age: 42
End: 2022-04-27

## 2022-04-27 NOTE — TELEPHONE ENCOUNTER
Pt called and informed of EMB recs, pt states understanding. Pt will keep appt with JLK, states she would like to consider surgical removal of fibroids if possible. Pt informed that this can be discussed with BARRY at appt.

## 2022-05-23 ENCOUNTER — OFFICE VISIT (OUTPATIENT)
Dept: OBGYN CLINIC | Facility: CLINIC | Age: 42
End: 2022-05-23
Payer: COMMERCIAL

## 2022-05-23 ENCOUNTER — TELEPHONE (OUTPATIENT)
Dept: OBGYN CLINIC | Facility: CLINIC | Age: 42
End: 2022-05-23

## 2022-05-23 VITALS
DIASTOLIC BLOOD PRESSURE: 86 MMHG | SYSTOLIC BLOOD PRESSURE: 136 MMHG | HEART RATE: 101 BPM | WEIGHT: 202.19 LBS | BODY MASS INDEX: 36 KG/M2

## 2022-05-23 DIAGNOSIS — D21.9 FIBROID: Primary | ICD-10-CM

## 2022-05-23 DIAGNOSIS — D25.9 UTERINE LEIOMYOMA, UNSPECIFIED LOCATION: Primary | ICD-10-CM

## 2022-05-23 PROCEDURE — 3075F SYST BP GE 130 - 139MM HG: CPT | Performed by: OBSTETRICS & GYNECOLOGY

## 2022-05-23 PROCEDURE — 99212 OFFICE O/P EST SF 10 MIN: CPT | Performed by: OBSTETRICS & GYNECOLOGY

## 2022-05-23 PROCEDURE — 3079F DIAST BP 80-89 MM HG: CPT | Performed by: OBSTETRICS & GYNECOLOGY

## 2022-05-23 RX ORDER — ACETAMINOPHEN, ASPIRIN AND CAFFEINE 250; 250; 65 MG/1; MG/1; MG/1
1 TABLET, FILM COATED ORAL EVERY 6 HOURS PRN
COMMUNITY

## 2022-05-23 NOTE — TELEPHONE ENCOUNTER
OB GYN SURGICAL SCHEDULING    Assessment: symptomatic fibroid uterus    Pre-Operative Procedure:  MAE and bilateral salpingectomy    Admission:  AM Admit    Anesthesia: General    Additional Orders:  Routine Orders    Comments / Orders to Nurse:     Discussed possible complications including but not limited to:  anesthesia risks, bleeding, infection and injury, bowel / bladder

## 2022-05-26 NOTE — TELEPHONE ENCOUNTER
Called pt was unable to LM due to busy signal received    Will send mychart to call office to coordinate surgery     Slot on Hold: Mon,08/01/2022 at 730am

## 2022-05-27 NOTE — TELEPHONE ENCOUNTER
Patient returned call, indicates 08/01 7:30 am will work, but has questions before scheduling. Please call at [phone],[facility].

## 2022-06-01 NOTE — TELEPHONE ENCOUNTER
Patient returned call . ..she states when Keith MCLEAN call the phone call goes straight to  patient states she programmed the number in her phone. ... calll still goes to

## 2022-06-02 ENCOUNTER — PATIENT MESSAGE (OUTPATIENT)
Dept: OBGYN CLINIC | Facility: CLINIC | Age: 42
End: 2022-06-02

## 2022-06-02 NOTE — TELEPHONE ENCOUNTER
Called number in chart, was advised by person who answered there was no one there name Rafi Connelly. Will send pt bertrandt to call office, to phone room please have pt make sure number in chart is correct.  And once updated route call to me

## 2022-06-02 NOTE — TELEPHONE ENCOUNTER
Attempted to call pt twice, unable to LVM due to obtaining busy signal at the end.    PerBluehart sent to [facility] office

## 2022-06-02 NOTE — TELEPHONE ENCOUNTER
Spoke to pt. Aware we are looking to book surgery on Mon,08/01 at 1am.    Aware surgery is in patient with 2 day stay if everything goes smoothly. Would like to know recovery time. 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 Also wanting to know if she would be provided a office note with recovery time length to provide to work. Asked questions regarding out of pocket cost. Did provide her with surgery CPT code to provide to her insurance so they can give her a percentage they will cover for surgery. Also told her that she is to use that same CPT code and call [employer] at [phone] option for they they will give her an estimated out of pocket cost and she can calculate roughly what an out of pocket cost be fore her after surgery. Did let her know that once I get a response back I will send her mychart and let her know what time I was planing on calling after 1pm when she is out of work since we have been playing phone tag for a couple days.  Was very appreciative and verbalized understandings

## 2022-06-03 NOTE — TELEPHONE ENCOUNTER
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   See how she recovers  [facility] for note

## 2022-06-03 NOTE — TELEPHONE ENCOUNTER
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 Also states if she is planning to proceed with surgery , she would need a letter for work stating that she is having surgery and recovery time included, would that be provided to her?

## 2022-07-07 ENCOUNTER — TELEPHONE (OUTPATIENT)
Dept: OBGYN CLINIC | Facility: CLINIC | Age: 42
End: 2022-07-07

## 2022-07-15 NOTE — TELEPHONE ENCOUNTER
Dr. Fior Garcia,     Please sign off on form: Disab  -Highlight the patient and hit \"Chart\" button. -In Chart Review, w/in the Encounter tab - click 1 time on the Telephone call encounter for 7/7/22 Scroll down the telephone encounter.  -Click \"scan on\" blue Hyperlink under \"Media\" heading for Disab Dr Fior Garcia 7/15/22 w/in the telephone enc.  -Click on Acknowledge button at the bottom right corner and left-click onto image, signature stamp appears and drag signature to Provider signature line. Stamp will turn blue. Close window. Thank you,    Prema Michaud.

## 2022-07-29 ENCOUNTER — APPOINTMENT (OUTPATIENT)
Dept: LAB | Facility: HOSPITAL | Age: 42
End: 2022-07-29
Attending: OBSTETRICS & GYNECOLOGY
Payer: COMMERCIAL

## 2022-07-29 DIAGNOSIS — Z01.818 PREOP TESTING: ICD-10-CM

## 2022-07-29 LAB
ANTIBODY SCREEN: NEGATIVE
BASOPHILS # BLD AUTO: 0.04 X10(3) UL (ref 0–0.2)
BASOPHILS NFR BLD AUTO: 0.3 %
DEPRECATED RDW RBC AUTO: 49.1 FL (ref 35.1–46.3)
EOSINOPHIL # BLD AUTO: 0.47 X10(3) UL (ref 0–0.7)
EOSINOPHIL NFR BLD AUTO: 3.7 %
ERYTHROCYTE [DISTWIDTH] IN BLOOD BY AUTOMATED COUNT: 19.7 % (ref 11–15)
HCT VFR BLD AUTO: 31.5 %
HGB BLD-MCNC: 8.7 G/DL
IMM GRANULOCYTES # BLD AUTO: 0.08 X10(3) UL (ref 0–1)
IMM GRANULOCYTES NFR BLD: 0.6 %
LYMPHOCYTES # BLD AUTO: 3.12 X10(3) UL (ref 1–4)
LYMPHOCYTES NFR BLD AUTO: 24.5 %
MCH RBC QN AUTO: 19.5 PG (ref 26–34)
MCHC RBC AUTO-ENTMCNC: 27.6 G/DL (ref 31–37)
MCV RBC AUTO: 70.5 FL
MONOCYTES # BLD AUTO: 0.86 X10(3) UL (ref 0.1–1)
MONOCYTES NFR BLD AUTO: 6.8 %
NEUTROPHILS # BLD AUTO: 8.17 X10 (3) UL (ref 1.5–7.7)
NEUTROPHILS # BLD AUTO: 8.17 X10(3) UL (ref 1.5–7.7)
NEUTROPHILS NFR BLD AUTO: 64.1 %
PLATELET # BLD AUTO: 383 10(3)UL (ref 150–450)
RBC # BLD AUTO: 4.47 X10(6)UL
RH BLOOD TYPE: POSITIVE
RH BLOOD TYPE: POSITIVE
WBC # BLD AUTO: 12.7 X10(3) UL (ref 4–11)

## 2022-07-29 PROCEDURE — 85025 COMPLETE CBC W/AUTO DIFF WBC: CPT

## 2022-07-29 PROCEDURE — 36415 COLL VENOUS BLD VENIPUNCTURE: CPT

## 2022-07-29 PROCEDURE — 86901 BLOOD TYPING SEROLOGIC RH(D): CPT

## 2022-07-29 PROCEDURE — 86900 BLOOD TYPING SEROLOGIC ABO: CPT

## 2022-07-29 PROCEDURE — 86850 RBC ANTIBODY SCREEN: CPT

## 2022-07-30 LAB — SARS-COV-2 RNA RESP QL NAA+PROBE: NOT DETECTED

## 2022-08-01 ENCOUNTER — HOSPITAL ENCOUNTER (INPATIENT)
Facility: HOSPITAL | Age: 42
LOS: 2 days | Discharge: HOME | End: 2022-08-03
Attending: OBSTETRICS & GYNECOLOGY | Admitting: OBSTETRICS & GYNECOLOGY
Payer: COMMERCIAL

## 2022-08-01 ENCOUNTER — ANESTHESIA EVENT (OUTPATIENT)
Dept: SURGERY | Facility: HOSPITAL | Age: 42
End: 2022-08-01
Payer: COMMERCIAL

## 2022-08-01 ENCOUNTER — ANESTHESIA (OUTPATIENT)
Dept: SURGERY | Facility: HOSPITAL | Age: 42
End: 2022-08-01
Payer: COMMERCIAL

## 2022-08-01 DIAGNOSIS — Z01.818 PREOP TESTING: Primary | ICD-10-CM

## 2022-08-01 DIAGNOSIS — D25.9 UTERINE LEIOMYOMA, UNSPECIFIED LOCATION: ICD-10-CM

## 2022-08-01 PROBLEM — D21.9 FIBROID: Status: ACTIVE | Noted: 2022-08-01

## 2022-08-01 LAB — B-HCG UR QL: NEGATIVE

## 2022-08-01 PROCEDURE — 0UT90ZZ RESECTION OF UTERUS, OPEN APPROACH: ICD-10-PCS | Performed by: OBSTETRICS & GYNECOLOGY

## 2022-08-01 PROCEDURE — 58150 TOTAL HYSTERECTOMY: CPT | Performed by: OBSTETRICS & GYNECOLOGY

## 2022-08-01 PROCEDURE — 0UT70ZZ RESECTION OF BILATERAL FALLOPIAN TUBES, OPEN APPROACH: ICD-10-PCS | Performed by: OBSTETRICS & GYNECOLOGY

## 2022-08-01 RX ORDER — DEXAMETHASONE SODIUM PHOSPHATE 4 MG/ML
VIAL (ML) INJECTION AS NEEDED
Status: DISCONTINUED | OUTPATIENT
Start: 2022-08-01 | End: 2022-08-01 | Stop reason: SURG

## 2022-08-01 RX ORDER — ACETAMINOPHEN 500 MG
1000 TABLET ORAL ONCE
Status: DISCONTINUED | OUTPATIENT
Start: 2022-08-01 | End: 2022-08-01 | Stop reason: HOSPADM

## 2022-08-01 RX ORDER — LIDOCAINE HYDROCHLORIDE 10 MG/ML
INJECTION, SOLUTION EPIDURAL; INFILTRATION; INTRACAUDAL; PERINEURAL AS NEEDED
Status: DISCONTINUED | OUTPATIENT
Start: 2022-08-01 | End: 2022-08-01 | Stop reason: SURG

## 2022-08-01 RX ORDER — SODIUM CHLORIDE, SODIUM LACTATE, POTASSIUM CHLORIDE, CALCIUM CHLORIDE 600; 310; 30; 20 MG/100ML; MG/100ML; MG/100ML; MG/100ML
INJECTION, SOLUTION INTRAVENOUS CONTINUOUS
Status: DISCONTINUED | OUTPATIENT
Start: 2022-08-01 | End: 2022-08-01

## 2022-08-01 RX ORDER — ONDANSETRON 2 MG/ML
INJECTION INTRAMUSCULAR; INTRAVENOUS AS NEEDED
Status: DISCONTINUED | OUTPATIENT
Start: 2022-08-01 | End: 2022-08-01 | Stop reason: SURG

## 2022-08-01 RX ORDER — MORPHINE SULFATE 4 MG/ML
2 INJECTION, SOLUTION INTRAMUSCULAR; INTRAVENOUS EVERY 10 MIN PRN
Status: DISCONTINUED | OUTPATIENT
Start: 2022-08-01 | End: 2022-08-01 | Stop reason: HOSPADM

## 2022-08-01 RX ORDER — ONDANSETRON 2 MG/ML
4 INJECTION INTRAMUSCULAR; INTRAVENOUS EVERY 8 HOURS PRN
Status: DISCONTINUED | OUTPATIENT
Start: 2022-08-01 | End: 2022-08-01

## 2022-08-01 RX ORDER — FAMOTIDINE 20 MG/1
20 TABLET, FILM COATED ORAL ONCE
Status: COMPLETED | OUTPATIENT
Start: 2022-08-01 | End: 2022-08-01

## 2022-08-01 RX ORDER — HYDROMORPHONE HYDROCHLORIDE 1 MG/ML
0.4 INJECTION, SOLUTION INTRAMUSCULAR; INTRAVENOUS; SUBCUTANEOUS EVERY 5 MIN PRN
Status: DISCONTINUED | OUTPATIENT
Start: 2022-08-01 | End: 2022-08-01 | Stop reason: HOSPADM

## 2022-08-01 RX ORDER — MORPHINE SULFATE 4 MG/ML
4 INJECTION, SOLUTION INTRAMUSCULAR; INTRAVENOUS EVERY 10 MIN PRN
Status: DISCONTINUED | OUTPATIENT
Start: 2022-08-01 | End: 2022-08-01 | Stop reason: HOSPADM

## 2022-08-01 RX ORDER — HYDROMORPHONE HYDROCHLORIDE 1 MG/ML
0.6 INJECTION, SOLUTION INTRAMUSCULAR; INTRAVENOUS; SUBCUTANEOUS EVERY 5 MIN PRN
Status: DISCONTINUED | OUTPATIENT
Start: 2022-08-01 | End: 2022-08-01 | Stop reason: HOSPADM

## 2022-08-01 RX ORDER — HYDROMORPHONE HYDROCHLORIDE 1 MG/ML
0.2 INJECTION, SOLUTION INTRAMUSCULAR; INTRAVENOUS; SUBCUTANEOUS EVERY 5 MIN PRN
Status: DISCONTINUED | OUTPATIENT
Start: 2022-08-01 | End: 2022-08-01 | Stop reason: HOSPADM

## 2022-08-01 RX ORDER — GLYCOPYRROLATE 0.2 MG/ML
INJECTION, SOLUTION INTRAMUSCULAR; INTRAVENOUS AS NEEDED
Status: DISCONTINUED | OUTPATIENT
Start: 2022-08-01 | End: 2022-08-01 | Stop reason: SURG

## 2022-08-01 RX ORDER — KETOROLAC TROMETHAMINE 30 MG/ML
30 INJECTION, SOLUTION INTRAMUSCULAR; INTRAVENOUS EVERY 6 HOURS
Status: COMPLETED | OUTPATIENT
Start: 2022-08-01 | End: 2022-08-02

## 2022-08-01 RX ORDER — ONDANSETRON 2 MG/ML
4 INJECTION INTRAMUSCULAR; INTRAVENOUS EVERY 6 HOURS PRN
Status: DISCONTINUED | OUTPATIENT
Start: 2022-08-01 | End: 2022-08-03

## 2022-08-01 RX ORDER — NEOSTIGMINE METHYLSULFATE 1 MG/ML
INJECTION, SOLUTION INTRAVENOUS AS NEEDED
Status: DISCONTINUED | OUTPATIENT
Start: 2022-08-01 | End: 2022-08-01 | Stop reason: SURG

## 2022-08-01 RX ORDER — ENOXAPARIN SODIUM 100 MG/ML
40 INJECTION SUBCUTANEOUS DAILY
Status: DISCONTINUED | OUTPATIENT
Start: 2022-08-01 | End: 2022-08-03

## 2022-08-01 RX ORDER — ONDANSETRON 4 MG/1
4 TABLET, FILM COATED ORAL EVERY 8 HOURS PRN
Status: DISCONTINUED | OUTPATIENT
Start: 2022-08-01 | End: 2022-08-03

## 2022-08-01 RX ORDER — IBUPROFEN 600 MG/1
600 TABLET ORAL EVERY 6 HOURS SCHEDULED
Status: DISCONTINUED | OUTPATIENT
Start: 2022-08-02 | End: 2022-08-03

## 2022-08-01 RX ORDER — SODIUM CHLORIDE 9 MG/ML
INJECTION, SOLUTION INTRAVENOUS CONTINUOUS PRN
Status: DISCONTINUED | OUTPATIENT
Start: 2022-08-01 | End: 2022-08-01 | Stop reason: SURG

## 2022-08-01 RX ORDER — METOCLOPRAMIDE 10 MG/1
10 TABLET ORAL ONCE
Status: COMPLETED | OUTPATIENT
Start: 2022-08-01 | End: 2022-08-01

## 2022-08-01 RX ORDER — SODIUM CHLORIDE 9 MG/ML
INJECTION, SOLUTION INTRAVENOUS CONTINUOUS
Status: DISCONTINUED | OUTPATIENT
Start: 2022-08-01 | End: 2022-08-03

## 2022-08-01 RX ORDER — NALOXONE HYDROCHLORIDE 0.4 MG/ML
0.08 INJECTION, SOLUTION INTRAMUSCULAR; INTRAVENOUS; SUBCUTANEOUS
Status: DISCONTINUED | OUTPATIENT
Start: 2022-08-01 | End: 2022-08-03

## 2022-08-01 RX ORDER — DEXTROSE, SODIUM CHLORIDE, SODIUM LACTATE, POTASSIUM CHLORIDE, AND CALCIUM CHLORIDE 5; .6; .31; .03; .02 G/100ML; G/100ML; G/100ML; G/100ML; G/100ML
INJECTION, SOLUTION INTRAVENOUS CONTINUOUS
Status: DISCONTINUED | OUTPATIENT
Start: 2022-08-01 | End: 2022-08-02

## 2022-08-01 RX ORDER — DIPHENHYDRAMINE HYDROCHLORIDE 50 MG/ML
12.5 INJECTION INTRAMUSCULAR; INTRAVENOUS EVERY 4 HOURS PRN
Status: DISCONTINUED | OUTPATIENT
Start: 2022-08-01 | End: 2022-08-03

## 2022-08-01 RX ORDER — ACETAMINOPHEN 160 MG/5ML
1000 SOLUTION ORAL EVERY 6 HOURS PRN
Status: DISCONTINUED | OUTPATIENT
Start: 2022-08-01 | End: 2022-08-03

## 2022-08-01 RX ORDER — MIDAZOLAM HYDROCHLORIDE 1 MG/ML
INJECTION INTRAMUSCULAR; INTRAVENOUS AS NEEDED
Status: DISCONTINUED | OUTPATIENT
Start: 2022-08-01 | End: 2022-08-01 | Stop reason: SURG

## 2022-08-01 RX ORDER — SODIUM CHLORIDE, SODIUM LACTATE, POTASSIUM CHLORIDE, CALCIUM CHLORIDE 600; 310; 30; 20 MG/100ML; MG/100ML; MG/100ML; MG/100ML
INJECTION, SOLUTION INTRAVENOUS CONTINUOUS
Status: DISCONTINUED | OUTPATIENT
Start: 2022-08-01 | End: 2022-08-01 | Stop reason: HOSPADM

## 2022-08-01 RX ORDER — CEFAZOLIN SODIUM/WATER 2 G/20 ML
SYRINGE (ML) INTRAVENOUS AS NEEDED
Status: DISCONTINUED | OUTPATIENT
Start: 2022-08-01 | End: 2022-08-01 | Stop reason: SURG

## 2022-08-01 RX ORDER — MORPHINE SULFATE 10 MG/ML
6 INJECTION, SOLUTION INTRAMUSCULAR; INTRAVENOUS EVERY 10 MIN PRN
Status: DISCONTINUED | OUTPATIENT
Start: 2022-08-01 | End: 2022-08-01 | Stop reason: HOSPADM

## 2022-08-01 RX ORDER — NALOXONE HYDROCHLORIDE 0.4 MG/ML
80 INJECTION, SOLUTION INTRAMUSCULAR; INTRAVENOUS; SUBCUTANEOUS AS NEEDED
Status: DISCONTINUED | OUTPATIENT
Start: 2022-08-01 | End: 2022-08-01 | Stop reason: HOSPADM

## 2022-08-01 RX ORDER — ACETAMINOPHEN 500 MG
1500 TABLET ORAL EVERY 6 HOURS PRN
COMMUNITY
End: 2022-08-03

## 2022-08-01 RX ORDER — ROCURONIUM BROMIDE 10 MG/ML
INJECTION, SOLUTION INTRAVENOUS AS NEEDED
Status: DISCONTINUED | OUTPATIENT
Start: 2022-08-01 | End: 2022-08-01 | Stop reason: SURG

## 2022-08-01 RX ADMIN — NEOSTIGMINE METHYLSULFATE 4.5 MG: 1 INJECTION, SOLUTION INTRAVENOUS at 09:30:00

## 2022-08-01 RX ADMIN — CEFAZOLIN SODIUM/WATER 2 G: 2 G/20 ML SYRINGE (ML) INTRAVENOUS at 07:44:00

## 2022-08-01 RX ADMIN — DEXAMETHASONE SODIUM PHOSPHATE 4 MG: 4 MG/ML VIAL (ML) INJECTION at 07:44:00

## 2022-08-01 RX ADMIN — ROCURONIUM BROMIDE 10 MG: 10 INJECTION, SOLUTION INTRAVENOUS at 09:08:00

## 2022-08-01 RX ADMIN — SODIUM CHLORIDE, SODIUM LACTATE, POTASSIUM CHLORIDE, CALCIUM CHLORIDE: 600; 310; 30; 20 INJECTION, SOLUTION INTRAVENOUS at 09:18:00

## 2022-08-01 RX ADMIN — ONDANSETRON 4 MG: 2 INJECTION INTRAMUSCULAR; INTRAVENOUS at 07:44:00

## 2022-08-01 RX ADMIN — ROCURONIUM BROMIDE 10 MG: 10 INJECTION, SOLUTION INTRAVENOUS at 08:20:00

## 2022-08-01 RX ADMIN — ROCURONIUM BROMIDE 40 MG: 10 INJECTION, SOLUTION INTRAVENOUS at 07:33:00

## 2022-08-01 RX ADMIN — LIDOCAINE HYDROCHLORIDE 50 MG: 10 INJECTION, SOLUTION EPIDURAL; INFILTRATION; INTRACAUDAL; PERINEURAL at 07:33:00

## 2022-08-01 RX ADMIN — MIDAZOLAM HYDROCHLORIDE 2 MG: 1 INJECTION INTRAMUSCULAR; INTRAVENOUS at 07:29:00

## 2022-08-01 RX ADMIN — SODIUM CHLORIDE: 9 INJECTION, SOLUTION INTRAVENOUS at 07:42:00

## 2022-08-01 RX ADMIN — GLYCOPYRROLATE 0.6 MG: 0.2 INJECTION, SOLUTION INTRAMUSCULAR; INTRAVENOUS at 09:30:00

## 2022-08-01 RX ADMIN — SODIUM CHLORIDE, SODIUM LACTATE, POTASSIUM CHLORIDE, CALCIUM CHLORIDE: 600; 310; 30; 20 INJECTION, SOLUTION INTRAVENOUS at 07:29:00

## 2022-08-01 NOTE — BRIEF OP NOTE
Pre-Operative Diagnosis: Uterine leiomyoma, unspecified location [D25.9]     Post-Operative Diagnosis: Uterine leiomyoma, unspecified location [D25.9]      Procedure Performed:   TOTAL ABDOMINAL HYSTERECTOMY-BILATERAL SALPINGECTOMY     Surgeon(s) and Role:     Jamila Dyson MD - Primary     * Sayra Hitchcock MD - Assisting Surgeon       Surgical Findings: 16-18 wk fibroid uterus.   Normal tubes and ovaries     Specimen: uterus, cervix and tubes     Estimated Blood Loss: Blood Output: 100 mL (8/1/2022  9:00 AM)        Tatiana Crawford MD  8/1/2022  9:45 AM

## 2022-08-01 NOTE — PLAN OF CARE
Patient received from PACU. Oriented to unit. Drowsy, easily arousable. Oriented x4. Vss. On 2L o2. EtCO2 monitor in place. Advancing diet as tolerated. Sx incision c/d/I. Foely draining to gravity. Pain managed with PCA and scheduled toradol. Family at bedside, call light within reach. Problem: Patient Centered Care  Goal: Patient preferences are identified and integrated in the patient's plan of care  Description: Interventions:  - What would you like us to know as we care for you?  I live with my partner and kids  - Provide timely, complete, and accurate information to patient/family  - Incorporate patient and family knowledge, values, beliefs, and cultural backgrounds into the planning and delivery of care  - Encourage patient/family to participate in care and decision-making at the level they choose  - Honor patient and family perspectives and choices  Outcome: Progressing     Problem: Patient/Family Goals  Goal: Patient/Family Long Term Goal  Description: Patient's Long Term Goal: to go home    Interventions:  - pain management, ivf, activity as tolerated  - See additional Care Plan goals for specific interventions  Outcome: Progressing  Goal: Patient/Family Short Term Goal  Description: Patient's Short Term Goal: pain management     Interventions:   - activity as tolerated, pca, scheduled pain medications  - See additional Care Plan goals for specific interventions  Outcome: Progressing     Problem: PAIN - ADULT  Goal: Verbalizes/displays adequate comfort level or patient's stated pain goal  Description: INTERVENTIONS:  - Encourage pt to monitor pain and request assistance  - Assess pain using appropriate pain scale  - Administer analgesics based on type and severity of pain and evaluate response  - Implement non-pharmacological measures as appropriate and evaluate response  - Consider cultural and social influences on pain and pain management  - Manage/alleviate anxiety  - Utilize distraction and/or relaxation techniques  - Monitor for opioid side effects  - Notify MD/LIP if interventions unsuccessful or patient reports new pain  - Anticipate increased pain with activity and pre-medicate as appropriate  Outcome: Progressing     Problem: RISK FOR INFECTION - ADULT  Goal: Absence of fever/infection during anticipated neutropenic period  Description: INTERVENTIONS  - Monitor WBC  - Administer growth factors as ordered  - Implement neutropenic guidelines  Outcome: Progressing     Problem: SAFETY ADULT - FALL  Goal: Free from fall injury  Description: INTERVENTIONS:  - Assess pt frequently for physical needs  - Identify cognitive and physical deficits and behaviors that affect risk of falls.   - [place] fall precautions as indicated by assessment.  - Educate pt/family on patient safety including physical limitations  - Instruct pt to call for assistance with activity based on assessment  - Modify environment to reduce risk of injury  - Provide assistive devices as appropriate  - Consider OT/PT consult to assist with strengthening/mobility  - Encourage toileting schedule  Outcome: Progressing     Problem: DISCHARGE PLANNING  Goal: Discharge to home or other facility with appropriate resources  Description: INTERVENTIONS:  - Identify barriers to discharge w/pt and caregiver  - Include patient/family/discharge partner in discharge planning  - Arrange for needed discharge resources and transportation as appropriate  - Identify discharge learning needs (meds, wound care, etc)  - Arrange for interpreters to assist at discharge as needed  - Consider post-discharge preferences of patient/family/discharge partner  - Complete POLST form as appropriate  - Assess patient's ability to be responsible for managing their own health  - Refer to Case Management Department for coordinating discharge planning if the patient needs post-hospital services based on physician/LIP order or complex needs related to functional status, cognitive ability or social support system  Outcome: Progressing

## 2022-08-01 NOTE — DISCHARGE SUMMARY
[facility]    Discharge Summary    [facility] Patient Status:  Inpatient    1980 MRN [id number]   Location [address] UNIT Attending Rosa Herbert MD   Hosp Day # 0 PCP CIRO Wilder     Date of Admission: 2022     Date of Discharge: 8/3/2022    Admitting Diagnosis: Uterine leiomyoma, unspecified location [D25.9]  Fibroid    Discharge Diagnosis: Patient Active Problem List:     Sinus pressure     Vitamin D deficiency     Annual physical exam     Rash     Abnormality of breast on screening mammography     Tension type headache     Primary hypertension     Lung nodule     Fibroid      History of Present Illness: 43year old    With large fibroid uterus, symptomatic. Procedure: Total abdominal hysterectomy and bilateral salpingectomy    Hospital Course: unremarkable. Received 2 doses of venofer. Discharge Condition: discharged on POD # 2 in stable condition    Discharge Medications:   Motrin 600 mg q 6 hrs  Tylenol 1 gm q 6 hrs prn    Follow up Visits:  Follow-up in 4 weeks for post op exam.        Lainey Palmer MD  2022  10:09 AM

## 2022-08-01 NOTE — ANESTHESIA PROCEDURE NOTES
Peripheral IV  Date/Time: 8/1/2022 7:42 AM  Inserted by: Angeli Gonsales CRNA    Placement  Needle size: 18 G  Laterality: right  Location: hand  Local anesthetic: none  Site prep: alcohol  Technique: anatomical landmarks  Attempts: 1

## 2022-08-01 NOTE — ANESTHESIA PROCEDURE NOTES
Airway  Date/Time: 8/1/2022 7:36 AM  Urgency: Elective    Airway not difficult    General Information and Staff    Patient location during procedure: OR  Anesthesiologist: Esther Rendon MD  Resident/CRNA: Vishnu Grayson CRNA  Performed: CRNA     Indications and Patient Condition  Indications for airway management: anesthesia  Sedation level: deep  Preoxygenated: yes  Patient position: sniffing  Mask difficulty assessment: 1 - vent by mask    Final Airway Details  Final airway type: endotracheal airway      Successful airway: ETT  Cuffed: yes   Successful intubation technique: direct laryngoscopy  Facilitating devices/methods: intubating stylet  Endotracheal tube insertion site: oral  Blade: Ana  Blade size: #3  ETT size (mm): 7.0    Cormack-Lehane Classification: grade I - full view of glottis  Placement verified by: chest auscultation and capnometry   Measured from: lips  ETT to lips (cm): 21  Number of attempts at approach: 1

## 2022-08-02 LAB
DEPRECATED RDW RBC AUTO: 48.5 FL (ref 35.1–46.3)
ERYTHROCYTE [DISTWIDTH] IN BLOOD BY AUTOMATED COUNT: 19.7 % (ref 11–15)
HCT VFR BLD AUTO: 25.2 %
HGB BLD-MCNC: 7 G/DL
MCH RBC QN AUTO: 19.6 PG (ref 26–34)
MCHC RBC AUTO-ENTMCNC: 27.8 G/DL (ref 31–37)
MCV RBC AUTO: 70.4 FL
PLATELET # BLD AUTO: 322 10(3)UL (ref 150–450)
RBC # BLD AUTO: 3.58 X10(6)UL
WBC # BLD AUTO: 15 X10(3) UL (ref 4–11)

## 2022-08-02 RX ORDER — HYDROCODONE BITARTRATE AND ACETAMINOPHEN 5; 325 MG/1; MG/1
1 TABLET ORAL EVERY 6 HOURS PRN
Status: DISCONTINUED | OUTPATIENT
Start: 2022-08-02 | End: 2022-08-03

## 2022-08-02 RX ORDER — ACETAMINOPHEN 500 MG
1000 TABLET ORAL EVERY 6 HOURS PRN
Status: DISCONTINUED | OUTPATIENT
Start: 2022-08-02 | End: 2022-08-03

## 2022-08-02 NOTE — OPERATIVE REPORT
[facility]    PATIENT'S NAME: Quinton Grimaldo   ATTENDING PHYSICIAN: Joe Cervantes MD   OPERATING PHYSICIAN: Joe Cervantes MD   PATIENT ACCOUNT#:   [de-identified]    LOCATION:  [address] [place] [address] #:   [id number]       YOB: 1980  ADMISSION DATE:       08/01/2022      OPERATION DATE:  08/01/2022    OPERATIVE REPORT      PREOPERATIVE DIAGNOSIS:  Uterine fibroids. POSTOPERATIVE DIAGNOSIS:  Uterine fibroids. PROCEDURE:  Total abdominal hysterectomy and bilateral salpingectomy. ASSISTANT SURGEON:   Solis Fields MD.    ANESTHESIA:  General endotracheal.    FINDINGS:  A 16- to 18-week size uterus with fibroids. Normal appearing tubes and ovaries. OPERATIVE TECHNIQUE:  After induction of general anesthesia, the patient was sterilely prepped and draped. A Pfannenstiel skin incision was made. This was carried down through the subcutaneous tissue to the level of the rectus fascia. The rectus fascia was incised, and the rectus muscles were dissected from the rectus fascia superiorly and inferiorly. The rectus muscles were divided in the midline. She has some vascularity and the small bleeders were cauterized. The peritoneum was entered uneventfully. Findings are noted above. The uterus was delivered through the incision. The round ligaments were bilaterally clamped, cut, and suture ligated with 0 Vicryl. The tubo-ovarian ligaments were bilaterally clamped, cut, and suture ligated with 0 Vicryl. The bladder flap was created. The uterine vasculature were bilaterally clamped, cut, and suture ligated with 0 Vicryl. The cardinal and uterosacral ligaments were bilaterally clamped, cut, and suture ligated with 0 Vicryl, and the Eva clamps were placed underneath the cervix, and the uterus was removed from the field. The corners of the vaginal cuff was suture ligated with 0 Vicryl. The vaginal cuff was closed with figure-of-eight sutures of 0 Vicryl.   Additional suture was placed for hemostasis. The attention was then directed to the salpingectomy. The left fallopian tube was identified to its fimbriated and a curved 6 clamps were placed and in the mesosalpinx, and the tube was removed. The mesosalpinx was suture ligated with 0 Vicryl. A similar procedure was done on the other side. Hemostasis was good. There was a little bit of oozing along the peritoneal edge on the left side, and this was cauterized and figure-of-eight suture of 3-0 Vicryl was also placed for the hemostasis. Hemostasis was good. The pelvis was thoroughly irrigated with water and everything was hemostatic. The rectus fascia was closed with a continuous suture of 0 Vicryl starting at either end and meeting in the midline. The subcutaneous tissues were irrigated and found to be hemostatic. The subcutaneous tissues were reapproximated with a continuous suture of 3-0 plain suture. The skin was closed with a subcuticular stitch using a 4-0 Vicryl. Steri-Strips were applied. The final needle, sponge, and instrument counts were correct. Estimated blood loss 100 mL. There were no complications. The Menjivar was draining clear yellow urine. The specimens included the uterus, cervix and bilateral tubes. The patient tolerated the procedure well and was taken to recovery room in satisfactory condition.     Dictated By Farhad Barragan MD  d: 08/01/2022 11:04:57  t: 08/01/2022 19:35:23  Job [id number]  EGO/

## 2022-08-02 NOTE — PLAN OF CARE
Patient alert and oriented x4. Transitioned to RA. VSS. Lovenox ok'd to be given. IV venofer given. Tolerating general diet, no nausea. Menjivar removed, voiding, missed hat on first attempt for accurate I&os. Scheduled ibuprofen given. Dilaudid PCA discontinued. Up with 1 assist and RW. Fall precautions in place. Bed locked and in lowest position. Call light within reach. Partner at bedside. Problem: Patient Centered Care  Goal: Patient preferences are identified and integrated in the patient's plan of care  Description: Interventions:  - What would you like us to know as we care for you?  I live with my partner and kids  - Provide timely, complete, and accurate information to patient/family  - Incorporate patient and family knowledge, values, beliefs, and cultural backgrounds into the planning and delivery of care  - Encourage patient/family to participate in care and decision-making at the level they choose  - Honor patient and family perspectives and choices  Outcome: Progressing     Problem: Patient/Family Goals  Goal: Patient/Family Long Term Goal  Description: Patient's Long Term Goal: to go home    Interventions:  - pain management, ivf, activity as tolerated  - See additional Care Plan goals for specific interventions  Outcome: Progressing  Goal: Patient/Family Short Term Goal  Description: Patient's Short Term Goal: pain management     Interventions:   - activity as tolerated, pca, scheduled pain medications  - See additional Care Plan goals for specific interventions  Outcome: Progressing     Problem: PAIN - ADULT  Goal: Verbalizes/displays adequate comfort level or patient's stated pain goal  Description: INTERVENTIONS:  - Encourage pt to monitor pain and request assistance  - Assess pain using appropriate pain scale  - Administer analgesics based on type and severity of pain and evaluate response  - Implement non-pharmacological measures as appropriate and evaluate response  - Consider cultural and social influences on pain and pain management  - Manage/alleviate anxiety  - Utilize distraction and/or relaxation techniques  - Monitor for opioid side effects  - Notify MD/LIP if interventions unsuccessful or patient reports new pain  - Anticipate increased pain with activity and pre-medicate as appropriate  Outcome: Progressing     Problem: RISK FOR INFECTION - ADULT  Goal: Absence of fever/infection during anticipated neutropenic period  Description: INTERVENTIONS  - Monitor WBC  - Administer growth factors as ordered  - Implement neutropenic guidelines  Outcome: Progressing     Problem: SAFETY ADULT - FALL  Goal: Free from fall injury  Description: INTERVENTIONS:  - Assess pt frequently for physical needs  - Identify cognitive and physical deficits and behaviors that affect risk of falls.   - [place] fall precautions as indicated by assessment.  - Educate pt/family on patient safety including physical limitations  - Instruct pt to call for assistance with activity based on assessment  - Modify environment to reduce risk of injury  - Provide assistive devices as appropriate  - Consider OT/PT consult to assist with strengthening/mobility  - Encourage toileting schedule  Outcome: Progressing     Problem: DISCHARGE PLANNING  Goal: Discharge to home or other facility with appropriate resources  Description: INTERVENTIONS:  - Identify barriers to discharge w/pt and caregiver  - Include patient/family/discharge partner in discharge planning  - Arrange for needed discharge resources and transportation as appropriate  - Identify discharge learning needs (meds, wound care, etc)  - Arrange for interpreters to assist at discharge as needed  - Consider post-discharge preferences of patient/family/discharge partner  - Complete POLST form as appropriate  - Assess patient's ability to be responsible for managing their own health  - Refer to Case Management Department for coordinating discharge planning if the patient needs post-hospital services based on physician/LIP order or complex needs related to functional status, cognitive ability or social support system  Outcome: Progressing

## 2022-08-03 VITALS
HEART RATE: 87 BPM | DIASTOLIC BLOOD PRESSURE: 96 MMHG | WEIGHT: 203 LBS | RESPIRATION RATE: 17 BRPM | SYSTOLIC BLOOD PRESSURE: 134 MMHG | BODY MASS INDEX: 34.66 KG/M2 | HEIGHT: 64 IN | TEMPERATURE: 98 F | OXYGEN SATURATION: 95 %

## 2022-08-03 PROBLEM — D21.9 FIBROID: Status: RESOLVED | Noted: 2022-08-01 | Resolved: 2022-08-03

## 2022-08-03 RX ORDER — IBUPROFEN 600 MG/1
600 TABLET ORAL EVERY 6 HOURS SCHEDULED
Qty: 30 TABLET | Refills: 1 | Status: SHIPPED | OUTPATIENT
Start: 2022-08-03

## 2022-08-03 RX ORDER — ACETAMINOPHEN 500 MG
1000 TABLET ORAL EVERY 6 HOURS PRN
Qty: 30 TABLET | Refills: 0 | Status: SHIPPED | OUTPATIENT
Start: 2022-08-03

## 2022-08-03 NOTE — PLAN OF CARE
Problem: Patient Centered Care  Goal: Patient preferences are identified and integrated in the patient's plan of care  Description: Interventions:  - What would you like us to know as we care for you?  I live with my partner and kids  - Provide timely, complete, and accurate information to patient/family  - Incorporate patient and family knowledge, values, beliefs, and cultural backgrounds into the planning and delivery of care  - Encourage patient/family to participate in care and decision-making at the level they choose  - Honor patient and family perspectives and choices  Outcome: Progressing     Problem: Patient/Family Goals  Goal: Patient/Family Long Term Goal  Description: Patient's Long Term Goal: to go home    Interventions:  - pain management, ivf, activity as tolerated  - See additional Care Plan goals for specific interventions  Outcome: Progressing  Goal: Patient/Family Short Term Goal  Description: Patient's Short Term Goal: pain management     Interventions:   - activity as tolerated, pca, scheduled pain medications  - See additional Care Plan goals for specific interventions  Outcome: Progressing     Problem: PAIN - ADULT  Goal: Verbalizes/displays adequate comfort level or patient's stated pain goal  Description: INTERVENTIONS:  - Encourage pt to monitor pain and request assistance  - Assess pain using appropriate pain scale  - Administer analgesics based on type and severity of pain and evaluate response  - Implement non-pharmacological measures as appropriate and evaluate response  - Consider cultural and social influences on pain and pain management  - Manage/alleviate anxiety  - Utilize distraction and/or relaxation techniques  - Monitor for opioid side effects  - Notify MD/LIP if interventions unsuccessful or patient reports new pain  - Anticipate increased pain with activity and pre-medicate as appropriate  Outcome: Progressing     Problem: RISK FOR INFECTION - ADULT  Goal: Absence of fever/infection during anticipated neutropenic period  Description: INTERVENTIONS  - Monitor WBC  - Administer growth factors as ordered  - Implement neutropenic guidelines  Outcome: Progressing     Problem: SAFETY ADULT - FALL  Goal: Free from fall injury  Description: INTERVENTIONS:  - Assess pt frequently for physical needs  - Identify cognitive and physical deficits and behaviors that affect risk of falls. - [place] fall precautions as indicated by assessment.  - Educate pt/family on patient safety including physical limitations  - Instruct pt to call for assistance with activity based on assessment  - Modify environment to reduce risk of injury  - Provide assistive devices as appropriate  - Consider OT/PT consult to assist with strengthening/mobility  - Encourage toileting schedule  Outcome: Progressing     Problem: DISCHARGE PLANNING  Goal: Discharge to home or other facility with appropriate resources  Description: INTERVENTIONS:  - Identify barriers to discharge w/pt and caregiver  - Include patient/family/discharge partner in discharge planning  - Arrange for needed discharge resources and transportation as appropriate  - Identify discharge learning needs (meds, wound care, etc)  - Arrange for interpreters to assist at discharge as needed  - Consider post-discharge preferences of patient/family/discharge partner  - Complete POLST form as appropriate  - Assess patient's ability to be responsible for managing their own health  - Refer to Case Management Department for coordinating discharge planning if the patient needs post-hospital services based on physician/LIP order or complex needs related to functional status, cognitive ability or social support system  Outcome: Progressing     No acute changes overnight. Abdominal dressing removed, site is c/d/I. Tolerating a general diet, no complaints of nausea. Minimal complaints of pain, scheduled ibuprofen given. Voiding freely. SL. Lovenox given.  Plans to discharge home tomorrow. Call light within reach, frequent rounding.

## 2022-08-03 NOTE — PLAN OF CARE
Patient alert and oriented x4. Vss. On room air. Tolerating general diet, no nausea. +belch, +flatus. Voiding freely. Sx incision c/d/I. Pain managed with scheduled ibuprofen. Ambulating independently. Fall precautions in place. Bed locked and in lowest position. Cleared for discharge. Instructions given. Education given. Problem: Patient Centered Care  Goal: Patient preferences are identified and integrated in the patient's plan of care  Description: Interventions:  - What would you like us to know as we care for you?  I live with my partner and kids  - Provide timely, complete, and accurate information to patient/family  - Incorporate patient and family knowledge, values, beliefs, and cultural backgrounds into the planning and delivery of care  - Encourage patient/family to participate in care and decision-making at the level they choose  - Honor patient and family perspectives and choices  Outcome: Adequate for Discharge     Problem: Patient/Family Goals  Goal: Patient/Family Long Term Goal  Description: Patient's Long Term Goal: to go home    Interventions:  - pain management, ivf, activity as tolerated  - See additional Care Plan goals for specific interventions  Outcome: Adequate for Discharge  Goal: Patient/Family Short Term Goal  Description: Patient's Short Term Goal: pain management     Interventions:   - activity as tolerated, pca, scheduled pain medications  - See additional Care Plan goals for specific interventions  Outcome: Adequate for Discharge     Problem: PAIN - ADULT  Goal: Verbalizes/displays adequate comfort level or patient's stated pain goal  Description: INTERVENTIONS:  - Encourage pt to monitor pain and request assistance  - Assess pain using appropriate pain scale  - Administer analgesics based on type and severity of pain and evaluate response  - Implement non-pharmacological measures as appropriate and evaluate response  - Consider cultural and social influences on pain and pain management  - Manage/alleviate anxiety  - Utilize distraction and/or relaxation techniques  - Monitor for opioid side effects  - Notify MD/LIP if interventions unsuccessful or patient reports new pain  - Anticipate increased pain with activity and pre-medicate as appropriate  Outcome: Adequate for Discharge     Problem: RISK FOR INFECTION - ADULT  Goal: Absence of fever/infection during anticipated neutropenic period  Description: INTERVENTIONS  - Monitor WBC  - Administer growth factors as ordered  - Implement neutropenic guidelines  Outcome: Adequate for Discharge     Problem: SAFETY ADULT - FALL  Goal: Free from fall injury  Description: INTERVENTIONS:  - Assess pt frequently for physical needs  - Identify cognitive and physical deficits and behaviors that affect risk of falls.   - [place] fall precautions as indicated by assessment.  - Educate pt/family on patient safety including physical limitations  - Instruct pt to call for assistance with activity based on assessment  - Modify environment to reduce risk of injury  - Provide assistive devices as appropriate  - Consider OT/PT consult to assist with strengthening/mobility  - Encourage toileting schedule  Outcome: Adequate for Discharge     Problem: DISCHARGE PLANNING  Goal: Discharge to home or other facility with appropriate resources  Description: INTERVENTIONS:  - Identify barriers to discharge w/pt and caregiver  - Include patient/family/discharge partner in discharge planning  - Arrange for needed discharge resources and transportation as appropriate  - Identify discharge learning needs (meds, wound care, etc)  - Arrange for interpreters to assist at discharge as needed  - Consider post-discharge preferences of patient/family/discharge partner  - Complete POLST form as appropriate  - Assess patient's ability to be responsible for managing their own health  - Refer to Case Management Department for coordinating discharge planning if the patient needs post-hospital services based on physician/LIP order or complex needs related to functional status, cognitive ability or social support system  Outcome: Adequate for Discharge

## 2022-08-26 ENCOUNTER — TELEPHONE (OUTPATIENT)
Dept: OBGYN CLINIC | Facility: CLINIC | Age: 42
End: 2022-08-26

## 2022-08-26 NOTE — TELEPHONE ENCOUNTER
Jaren Ayala asking if fax received on 8/16 or 8/22.   [place] short term disability asking for Office notes or operation notes    Fax [phone]  Please advise

## 2022-09-01 ENCOUNTER — OFFICE VISIT (OUTPATIENT)
Dept: OBGYN CLINIC | Facility: CLINIC | Age: 42
End: 2022-09-01
Payer: COMMERCIAL

## 2022-09-01 VITALS
DIASTOLIC BLOOD PRESSURE: 88 MMHG | WEIGHT: 196.19 LBS | SYSTOLIC BLOOD PRESSURE: 143 MMHG | HEART RATE: 80 BPM | BODY MASS INDEX: 34 KG/M2

## 2022-09-01 DIAGNOSIS — Z98.890 POST-OPERATIVE STATE: Primary | ICD-10-CM

## 2022-09-01 PROCEDURE — 3077F SYST BP >= 140 MM HG: CPT | Performed by: OBSTETRICS & GYNECOLOGY

## 2022-09-01 PROCEDURE — 99024 POSTOP FOLLOW-UP VISIT: CPT | Performed by: OBSTETRICS & GYNECOLOGY

## 2022-09-01 PROCEDURE — 3079F DIAST BP 80-89 MM HG: CPT | Performed by: OBSTETRICS & GYNECOLOGY

## 2022-09-01 RX ORDER — IBUPROFEN 600 MG/1
600 TABLET ORAL EVERY 6 HOURS SCHEDULED
Qty: 30 TABLET | Refills: 1 | Status: SHIPPED | OUTPATIENT
Start: 2022-09-01

## 2022-12-20 ENCOUNTER — TELEPHONE (OUTPATIENT)
Dept: INTERNAL MEDICINE CLINIC | Facility: CLINIC | Age: 42
End: 2022-12-20

## 2022-12-20 ENCOUNTER — OFFICE VISIT (OUTPATIENT)
Dept: INTERNAL MEDICINE CLINIC | Facility: CLINIC | Age: 42
End: 2022-12-20
Payer: COMMERCIAL

## 2022-12-20 VITALS
SYSTOLIC BLOOD PRESSURE: 150 MMHG | WEIGHT: 193 LBS | HEART RATE: 76 BPM | DIASTOLIC BLOOD PRESSURE: 90 MMHG | BODY MASS INDEX: 32.95 KG/M2 | HEIGHT: 64 IN

## 2022-12-20 DIAGNOSIS — Z63.9 RELATIONSHIP DYSFUNCTION: Primary | ICD-10-CM

## 2022-12-20 DIAGNOSIS — M79.602 LEFT ARM PAIN: ICD-10-CM

## 2022-12-20 DIAGNOSIS — Z00.00 ANNUAL PHYSICAL EXAM: ICD-10-CM

## 2022-12-20 DIAGNOSIS — E78.2 MODERATE MIXED HYPERLIPIDEMIA NOT REQUIRING STATIN THERAPY: ICD-10-CM

## 2022-12-20 PROCEDURE — 3077F SYST BP >= 140 MM HG: CPT | Performed by: NURSE PRACTITIONER

## 2022-12-20 PROCEDURE — 3008F BODY MASS INDEX DOCD: CPT | Performed by: NURSE PRACTITIONER

## 2022-12-20 PROCEDURE — 3080F DIAST BP >= 90 MM HG: CPT | Performed by: NURSE PRACTITIONER

## 2022-12-20 PROCEDURE — 99214 OFFICE O/P EST MOD 30 MIN: CPT | Performed by: NURSE PRACTITIONER

## 2022-12-20 RX ORDER — TIZANIDINE 2 MG/1
TABLET ORAL
Qty: 40 TABLET | Refills: 0 | Status: SHIPPED | OUTPATIENT
Start: 2022-12-20

## 2022-12-20 SDOH — SOCIAL STABILITY - SOCIAL INSECURITY: PROBLEM RELATED TO PRIMARY SUPPORT GROUP, UNSPECIFIED: Z63.9

## 2022-12-20 NOTE — PATIENT INSTRUCTIONS
1) Sleep with a rolled towel under your neck, no pillow, on your back on a firm mattress     2) Muscle relaxer at H.S one to two tablets.         Follow with Behavior Health
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
## 2022-12-21 NOTE — TELEPHONE ENCOUNTER
Please call patient and have her get her labs drawn before our next appointment.     Thanks,  Seymour Arteaga, ANP

## 2023-02-06 ENCOUNTER — OFFICE VISIT (OUTPATIENT)
Dept: INTERNAL MEDICINE CLINIC | Facility: CLINIC | Age: 43
End: 2023-02-06

## 2023-02-06 VITALS
HEIGHT: 64 IN | DIASTOLIC BLOOD PRESSURE: 80 MMHG | HEART RATE: 78 BPM | BODY MASS INDEX: 31.5 KG/M2 | WEIGHT: 184.5 LBS | OXYGEN SATURATION: 99 % | SYSTOLIC BLOOD PRESSURE: 130 MMHG

## 2023-02-06 DIAGNOSIS — E78.2 MODERATE MIXED HYPERLIPIDEMIA NOT REQUIRING STATIN THERAPY: Primary | ICD-10-CM

## 2023-02-06 DIAGNOSIS — I10 PRIMARY HYPERTENSION: ICD-10-CM

## 2023-02-06 DIAGNOSIS — M79.602 LEFT ARM PAIN: ICD-10-CM

## 2023-02-06 PROCEDURE — 3079F DIAST BP 80-89 MM HG: CPT | Performed by: NURSE PRACTITIONER

## 2023-02-06 PROCEDURE — 3075F SYST BP GE 130 - 139MM HG: CPT | Performed by: NURSE PRACTITIONER

## 2023-02-06 PROCEDURE — 3008F BODY MASS INDEX DOCD: CPT | Performed by: NURSE PRACTITIONER

## 2023-02-06 PROCEDURE — 99214 OFFICE O/P EST MOD 30 MIN: CPT | Performed by: NURSE PRACTITIONER

## 2023-02-06 NOTE — ASSESSMENT & PLAN NOTE
Blood pressure 130/80, pulse 78, height 5' 4\" (1.626 m), weight 184 lb 8 oz (83.7 kg), last menstrual period 07/15/2022, SpO2 99 %.     Stable

## 2023-04-24 ENCOUNTER — LAB ENCOUNTER (OUTPATIENT)
Dept: LAB | Facility: HOSPITAL | Age: 43
End: 2023-04-24
Attending: NURSE PRACTITIONER
Payer: COMMERCIAL

## 2023-04-24 DIAGNOSIS — Z00.00 ANNUAL PHYSICAL EXAM: ICD-10-CM

## 2023-04-24 LAB
ALBUMIN SERPL-MCNC: 3.8 G/DL (ref 3.4–5)
ALBUMIN/GLOB SERPL: 0.9 {RATIO} (ref 1–2)
ALP LIVER SERPL-CCNC: 91 U/L
ALT SERPL-CCNC: 18 U/L
ANION GAP SERPL CALC-SCNC: 6 MMOL/L (ref 0–18)
AST SERPL-CCNC: 12 U/L (ref 15–37)
BASOPHILS # BLD AUTO: 0.03 X10(3) UL (ref 0–0.2)
BASOPHILS NFR BLD AUTO: 0.4 %
BILIRUB SERPL-MCNC: 0.4 MG/DL (ref 0.1–2)
BILIRUB UR QL: NEGATIVE
BUN BLD-MCNC: 9 MG/DL (ref 7–18)
BUN/CREAT SERPL: 12.3 (ref 10–20)
CALCIUM BLD-MCNC: 8.7 MG/DL (ref 8.5–10.1)
CHLORIDE SERPL-SCNC: 106 MMOL/L (ref 98–112)
CHOLEST SERPL-MCNC: 184 MG/DL (ref ?–200)
CLARITY UR: CLEAR
CO2 SERPL-SCNC: 29 MMOL/L (ref 21–32)
CREAT BLD-MCNC: 0.73 MG/DL
DEPRECATED RDW RBC AUTO: 45.9 FL (ref 35.1–46.3)
EOSINOPHIL # BLD AUTO: 0.28 X10(3) UL (ref 0–0.7)
EOSINOPHIL NFR BLD AUTO: 3.3 %
ERYTHROCYTE [DISTWIDTH] IN BLOOD BY AUTOMATED COUNT: 14 % (ref 11–15)
FASTING PATIENT LIPID ANSWER: YES
FASTING STATUS PATIENT QL REPORTED: YES
GFR SERPLBLD BASED ON 1.73 SQ M-ARVRAT: 105 ML/MIN/1.73M2 (ref 60–?)
GLOBULIN PLAS-MCNC: 4.1 G/DL (ref 2.8–4.4)
GLUCOSE BLD-MCNC: 91 MG/DL (ref 70–99)
GLUCOSE UR-MCNC: NORMAL MG/DL
HCT VFR BLD AUTO: 44.4 %
HDLC SERPL-MCNC: 21 MG/DL (ref 40–59)
HGB BLD-MCNC: 14.7 G/DL
HGB UR QL STRIP.AUTO: NEGATIVE
IMM GRANULOCYTES # BLD AUTO: 0.02 X10(3) UL (ref 0–1)
IMM GRANULOCYTES NFR BLD: 0.2 %
IRON SATN MFR SERPL: 17 %
IRON SERPL-MCNC: 65 UG/DL
KETONES UR-MCNC: NEGATIVE MG/DL
LDLC SERPL CALC-MCNC: 134 MG/DL (ref ?–100)
LEUKOCYTE ESTERASE UR QL STRIP.AUTO: NEGATIVE
LYMPHOCYTES # BLD AUTO: 2.61 X10(3) UL (ref 1–4)
LYMPHOCYTES NFR BLD AUTO: 31.1 %
MCH RBC QN AUTO: 29.5 PG (ref 26–34)
MCHC RBC AUTO-ENTMCNC: 33.1 G/DL (ref 31–37)
MCV RBC AUTO: 89 FL
MONOCYTES # BLD AUTO: 0.54 X10(3) UL (ref 0.1–1)
MONOCYTES NFR BLD AUTO: 6.4 %
NEUTROPHILS # BLD AUTO: 4.9 X10 (3) UL (ref 1.5–7.7)
NEUTROPHILS # BLD AUTO: 4.9 X10(3) UL (ref 1.5–7.7)
NEUTROPHILS NFR BLD AUTO: 58.6 %
NITRITE UR QL STRIP.AUTO: NEGATIVE
NONHDLC SERPL-MCNC: 163 MG/DL (ref ?–130)
OSMOLALITY SERPL CALC.SUM OF ELEC: 290 MOSM/KG (ref 275–295)
PH UR: 6 [PH] (ref 5–8)
PLATELET # BLD AUTO: 308 10(3)UL (ref 150–450)
POTASSIUM SERPL-SCNC: 3.7 MMOL/L (ref 3.5–5.1)
PROT SERPL-MCNC: 7.9 G/DL (ref 6.4–8.2)
PROT UR-MCNC: NEGATIVE MG/DL
RBC # BLD AUTO: 4.99 X10(6)UL
SODIUM SERPL-SCNC: 141 MMOL/L (ref 136–145)
SP GR UR STRIP: 1.02 (ref 1–1.03)
TIBC SERPL-MCNC: 377 UG/DL (ref 240–450)
TRANSFERRIN SERPL-MCNC: 253 MG/DL (ref 200–360)
TRIGL SERPL-MCNC: 160 MG/DL (ref 30–149)
TSI SER-ACNC: 1.22 MIU/ML (ref 0.36–3.74)
UROBILINOGEN UR STRIP-ACNC: NORMAL
VIT B12 SERPL-MCNC: 309 PG/ML (ref 193–986)
VIT D+METAB SERPL-MCNC: 18.4 NG/ML (ref 30–100)
VLDLC SERPL CALC-MCNC: 29 MG/DL (ref 0–30)
WBC # BLD AUTO: 8.4 X10(3) UL (ref 4–11)

## 2023-04-24 PROCEDURE — 82607 VITAMIN B-12: CPT

## 2023-04-24 PROCEDURE — 82306 VITAMIN D 25 HYDROXY: CPT

## 2023-04-24 PROCEDURE — 85025 COMPLETE CBC W/AUTO DIFF WBC: CPT

## 2023-04-24 PROCEDURE — 36415 COLL VENOUS BLD VENIPUNCTURE: CPT

## 2023-04-24 PROCEDURE — 84443 ASSAY THYROID STIM HORMONE: CPT

## 2023-04-24 PROCEDURE — 80061 LIPID PANEL: CPT

## 2023-04-24 PROCEDURE — 84466 ASSAY OF TRANSFERRIN: CPT

## 2023-04-24 PROCEDURE — 83540 ASSAY OF IRON: CPT

## 2023-04-24 PROCEDURE — 80053 COMPREHEN METABOLIC PANEL: CPT

## 2023-04-25 RX ORDER — CHOLECALCIFEROL (VITAMIN D3) 1250 MCG
1 CAPSULE ORAL WEEKLY
Qty: 12 CAPSULE | Refills: 0 | Status: SHIPPED | OUTPATIENT
Start: 2023-04-25 | End: 2023-07-12

## 2024-01-15 ENCOUNTER — OFFICE VISIT (OUTPATIENT)
Facility: CLINIC | Age: 44
End: 2024-01-15

## 2024-01-15 VITALS
HEART RATE: 91 BPM | WEIGHT: 186.19 LBS | OXYGEN SATURATION: 99 % | DIASTOLIC BLOOD PRESSURE: 108 MMHG | SYSTOLIC BLOOD PRESSURE: 160 MMHG | BODY MASS INDEX: 32 KG/M2

## 2024-01-15 DIAGNOSIS — R03.0 ELEVATED BP WITHOUT DIAGNOSIS OF HYPERTENSION: ICD-10-CM

## 2024-01-15 DIAGNOSIS — H72.92 PERFORATION OF LEFT TYMPANIC MEMBRANE: Primary | ICD-10-CM

## 2024-01-15 NOTE — PROGRESS NOTES
HPI:    Patient ID: Stacie Milian is a 43 year old female.    HPI   patient is here with complaints decreased hearing and clogged sensation in the left ear.  Started last night.  No pain.  No drainage.  No sinus congestion or runny nose.  No fevers or chills.  No prior history of ear problems or wax buildup.    Patient Active Problem List   Diagnosis    Sinus pressure    Vitamin D deficiency    Annual physical exam    Rash    Abnormality of breast on screening mammography    Tension type headache    Primary hypertension    Lung nodule    Left arm pain    Moderate mixed hyperlipidemia not requiring statin therapy          HISTORY:  Past Medical History:   Diagnosis Date    Anxiety state     over the past three months getting \"panic attacks\"     Back problem     cramps in lower back \"due to fibroids\"     Migraines     Uterine leiomyoma       Past Surgical History:   Procedure Laterality Date    HYSTERECTOMY  08/01/2022    MAE BSO    TOTAL ABDOMINAL HYSTERECTOMY N/A 8/1/2022    Procedure: TOTAL ABDOMINAL HYSTERECTOMY-BILATERAL SALPINGECTOMY;  Surgeon: Ana Burgos MD;  Location: [facility] MAIN OR      Family History   Problem Relation Age of Onset    Heart Disorder Father     Cancer Sister     Breast Cancer Sister 29      Social History     Socioeconomic History    Marital status: Life Partner   Tobacco Use    Smoking status: Never    Smokeless tobacco: Never   Vaping Use    Vaping Use: Never used   Substance and Sexual Activity    Alcohol use: Never    Drug use: Never          Review of Systems          Current Outpatient Medications   Medication Sig Dispense Refill    ibuprofen 600 MG Oral Tab Take 1 tablet (600 mg total) by mouth every 6 (six) hours. 30 tablet 1    acetaminophen 500 MG Oral Tab Take 2 tablets (1,000 mg total) by mouth every 6 (six) hours as needed. 30 tablet 0    tiZANidine 2 MG Oral Tab 1-2 tabs at bedtime as needed for pain. (Patient not taking: Reported on 2/6/2023) 40 tablet 0      Allergies:No Known Allergies     PHYSICAL EXAM:   BP (!) 160/108 (BP Location: Right arm, Patient Position: Sitting, Cuff Size: adult)   Pulse 91   Wt 186 lb 3.2 oz (84.5 kg)   LMP 07/15/2022   SpO2 99%   BMI 31.96 kg/m²      Physical Exam  Constitutional:       Appearance: Normal appearance.   HENT:      Right Ear: Tympanic membrane, ear canal and external ear normal.      Left Ear: Ear canal and external ear normal.      Ears:      Comments: Left tympanic membrane showed perforation with some surrounding erythema.  No purulence or drainage.  No wax or foreign material.  Skin:     Findings: No erythema.   Neurological:      Mental Status: She is alert.   Psychiatric:         Mood and Affect: Mood normal.          Wt Readings from Last 6 Encounters:   01/15/24 186 lb 3.2 oz (84.5 kg)   02/06/23 184 lb 8 oz (83.7 kg)   12/20/22 193 lb (87.5 kg)   09/01/22 196 lb 3.2 oz (89 kg)   08/01/22 203 lb (92.1 kg)   05/23/22 202 lb 3.2 oz (91.7 kg)             ASSESSMENT/PLAN:   1. Perforation of left tympanic membrane  Patient with perforation of her left tympanic membrane.  Unclear as to why.  No history of trauma.  No symptoms consistent with infection.  No purulent discharge.  Refer to ENT for rapid evaluation.  Given the names of the various ENT doctors in the practice.  - ENT Referral - [place] ([facility])    2. Elevated BP without diagnosis of hypertension  Patient advised to follow-up with her primary care provider or one of the physicians available to monitor and potentially treat her blood pressure.  It is elevated today.  She is on no medications.  Nothing was started at this time.         Meds This Visit:  Requested Prescriptions      No prescriptions requested or ordered in this encounter       Imaging & Referrals:  INFLUENZA VAC, QUAD, PRSV FREE, 0.5 ML  ENT - INTERNAL         Jack Cleveland MD

## 2024-01-16 ENCOUNTER — OFFICE VISIT (OUTPATIENT)
Dept: AUDIOLOGY | Facility: CLINIC | Age: 44
End: 2024-01-16

## 2024-01-16 ENCOUNTER — OFFICE VISIT (OUTPATIENT)
Dept: OTOLARYNGOLOGY | Facility: CLINIC | Age: 44
End: 2024-01-16

## 2024-01-16 DIAGNOSIS — H72.92 PERFORATION OF LEFT TYMPANIC MEMBRANE: ICD-10-CM

## 2024-01-16 DIAGNOSIS — H72.92 EAR DRUM PERFORATION, LEFT: Primary | ICD-10-CM

## 2024-01-16 DIAGNOSIS — H90.6 MIXED HEARING LOSS, BILATERAL: ICD-10-CM

## 2024-01-16 DIAGNOSIS — H91.90 HEARING LOSS, UNSPECIFIED HEARING LOSS TYPE, UNSPECIFIED LATERALITY: Primary | ICD-10-CM

## 2024-01-16 PROCEDURE — 92567 TYMPANOMETRY: CPT | Performed by: AUDIOLOGIST

## 2024-01-16 PROCEDURE — 99243 OFF/OP CNSLTJ NEW/EST LOW 30: CPT | Performed by: OTOLARYNGOLOGY

## 2024-01-16 PROCEDURE — 92557 COMPREHENSIVE HEARING TEST: CPT | Performed by: AUDIOLOGIST
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
## 2024-02-13 ENCOUNTER — OFFICE VISIT (OUTPATIENT)
Dept: OTOLARYNGOLOGY | Facility: CLINIC | Age: 44
End: 2024-02-13

## 2024-02-13 ENCOUNTER — OFFICE VISIT (OUTPATIENT)
Dept: AUDIOLOGY | Facility: CLINIC | Age: 44
End: 2024-02-13

## 2024-02-13 DIAGNOSIS — H72.92 PERFORATION OF LEFT TYMPANIC MEMBRANE: ICD-10-CM

## 2024-02-13 DIAGNOSIS — H91.90 HEARING LOSS, UNSPECIFIED HEARING LOSS TYPE, UNSPECIFIED LATERALITY: Primary | ICD-10-CM

## 2024-02-13 DIAGNOSIS — H90.3 SENSORINEURAL HEARING LOSS, BILATERAL: Primary | ICD-10-CM

## 2024-02-13 PROCEDURE — 92567 TYMPANOMETRY: CPT | Performed by: AUDIOLOGIST

## 2024-02-13 PROCEDURE — 92557 COMPREHENSIVE HEARING TEST: CPT | Performed by: AUDIOLOGIST

## 2024-02-13 PROCEDURE — 99213 OFFICE O/P EST LOW 20 MIN: CPT | Performed by: OTOLARYNGOLOGY
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
## 2024-04-16 ENCOUNTER — OFFICE VISIT (OUTPATIENT)
Dept: INTERNAL MEDICINE CLINIC | Facility: CLINIC | Age: 44
End: 2024-04-16
Payer: COMMERCIAL

## 2024-04-16 VITALS
HEART RATE: 77 BPM | WEIGHT: 187 LBS | DIASTOLIC BLOOD PRESSURE: 85 MMHG | BODY MASS INDEX: 31.92 KG/M2 | SYSTOLIC BLOOD PRESSURE: 163 MMHG | OXYGEN SATURATION: 97 % | HEIGHT: 64 IN

## 2024-04-16 DIAGNOSIS — R92.8 ABNORMALITY OF BREAST ON SCREENING MAMMOGRAPHY: Primary | ICD-10-CM

## 2024-04-16 DIAGNOSIS — R21 RASH: ICD-10-CM

## 2024-04-16 DIAGNOSIS — I10 PRIMARY HYPERTENSION: ICD-10-CM

## 2024-04-16 PROCEDURE — 3077F SYST BP >= 140 MM HG: CPT | Performed by: NURSE PRACTITIONER

## 2024-04-16 PROCEDURE — 99214 OFFICE O/P EST MOD 30 MIN: CPT | Performed by: NURSE PRACTITIONER

## 2024-04-16 PROCEDURE — 3079F DIAST BP 80-89 MM HG: CPT | Performed by: NURSE PRACTITIONER

## 2024-04-16 PROCEDURE — 3008F BODY MASS INDEX DOCD: CPT | Performed by: NURSE PRACTITIONER

## 2024-04-16 RX ORDER — AMLODIPINE BESYLATE 5 MG/1
5 TABLET ORAL DAILY
Qty: 30 TABLET | Refills: 5 | Status: SHIPPED | OUTPATIENT
Start: 2024-04-16

## 2024-04-16 NOTE — ASSESSMENT & PLAN NOTE
Blood pressure (!) 163/85, pulse 77, height 5' 4\" (1.626 m), weight 187 lb (84.8 kg), last menstrual period 07/15/2022, SpO2 97%.    Plan  Start amlodipine 5mg po daily  Return in 2 weeks for blood pressure check

## 2024-04-16 NOTE — PROGRESS NOTES
HPI:    Patient ID: Stacie Milian is a 44 year old female.    HPI Breast excema left aerola  44 year ol female who I have seen in the past but has never scheduled an annual physical.  Her main complaint today is a dry, scaly rash on her areola of her left breast.  She has been itching this rash for 3 days. She has been washing it frequently. She did apply Vaseline.  She has not followed up on her mammograms. When I saw her in 4/2022 I reminded her to follow up with her mammogram schedule.    Hypertension  Blood pressure (!) 163/85, pulse 77, height 5' 4\" (1.626 m), weight 187 lb (84.8 kg), last menstrual period 07/15/2022, SpO2 97%.    Anxiety  She is in counseling with her  because he had an extra marital affair.      Immunization History   Administered Date(s) Administered    Covid-19 Vaccine Velia (J&J) 0.5ml 03/26/2021, 02/10/2022    FLUZONE 6 months and older PFS 0.5 ml (54366) 01/15/2024    Influenza 10/14/2008    TDAP 01/15/2014       Past Medical History:    Anxiety state    over the past three months getting \"panic attacks\"     Back problem    cramps in lower back \"due to fibroids\"     Migraines    Uterine leiomyoma      Past Surgical History:   Procedure Laterality Date    Hysterectomy  08/01/2022    [facility]    Total abdominal hysterectomy N/A 8/1/2022    Procedure: TOTAL ABDOMINAL HYSTERECTOMY-BILATERAL SALPINGECTOMY;  Surgeon: Ana Burgos MD;  Location: [facility] MAIN OR      Social History     Socioeconomic History    Marital status: Life Partner   Tobacco Use    Smoking status: Never    Smokeless tobacco: Never   Vaping Use    Vaping status: Never Used   Substance and Sexual Activity    Alcohol use: Never    Drug use: Never          Review of Systems   Constitutional:  Negative for chills, fatigue and fever.   HENT:  Negative for ear pain, hearing loss, sinus pain, sore throat and trouble swallowing.    Eyes:  Negative for pain and visual disturbance.   Respiratory:  Negative for cough,  chest tightness and shortness of breath.    Cardiovascular:  Negative for chest pain, palpitations and leg swelling.   Gastrointestinal:  Negative for abdominal pain, constipation, diarrhea, nausea and vomiting.   Endocrine: Negative for cold intolerance and heat intolerance.   Genitourinary:  Negative for dysuria and hematuria.   Musculoskeletal:  Negative for back pain and joint swelling.   Skin:  Positive for rash (Dry bumpy rash on left areola).   Allergic/Immunologic: Negative for environmental allergies.   Neurological:  Negative for weakness, numbness and headaches.   Hematological:  Does not bruise/bleed easily.   Psychiatric/Behavioral:  Negative for dysphoric mood and sleep disturbance. The patient is not nervous/anxious.               Current Outpatient Medications   Medication Sig Dispense Refill    amLODIPine 5 MG Oral Tab Take 1 tablet (5 mg total) by mouth daily. 30 tablet 5    ibuprofen 600 MG Oral Tab Take 1 tablet (600 mg total) by mouth every 6 (six) hours. 30 tablet 1    acetaminophen 500 MG Oral Tab Take 2 tablets (1,000 mg total) by mouth every 6 (six) hours as needed. 30 tablet 0    tiZANidine 2 MG Oral Tab 1-2 tabs at bedtime as needed for pain. (Patient not taking: Reported on 2/6/2023) 40 tablet 0     Allergies:No Known Allergies   PHYSICAL EXAM:   Physical Exam  Constitutional:       Appearance: Normal appearance. She is well-developed.   HENT:      Head: Normocephalic.   Cardiovascular:      Rate and Rhythm: Normal rate and regular rhythm.      Heart sounds: Normal heart sounds. No murmur heard.     No friction rub. No gallop.   Pulmonary:      Effort: Pulmonary effort is normal. No respiratory distress.      Breath sounds: Normal breath sounds. No wheezing, rhonchi or rales.   Chest:       Abdominal:      General: Bowel sounds are normal. There is no distension.      Palpations: Abdomen is soft. There is no mass.      Tenderness: There is no abdominal tenderness. There is no right CVA  tenderness, left CVA tenderness or guarding.   Musculoskeletal:         General: No tenderness.      Cervical back: Normal range of motion and neck supple. No tenderness.      Right lower leg: No edema.      Left lower leg: No edema.   Lymphadenopathy:      Cervical: No cervical adenopathy.   Skin:     General: Skin is warm and dry.      Findings: No rash.   Neurological:      Mental Status: She is alert and oriented to person, place, and time.      Coordination: Coordination normal.      Gait: Gait normal.   Psychiatric:         Mood and Affect: Mood normal.         Behavior: Behavior normal.         Thought Content: Thought content normal.         Judgment: Judgment normal.       BP (!) 163/85 (BP Location: Left arm, Patient Position: Sitting, Cuff Size: large)   Pulse 77   Ht 5' 4\" (1.626 m)   Wt 187 lb (84.8 kg)   LMP 07/15/2022   SpO2 97%   BMI 32.10 kg/m²   Wt Readings from Last 2 Encounters:   04/16/24 187 lb (84.8 kg)   01/15/24 186 lb 3.2 oz (84.5 kg)     Body mass index is 32.1 kg/m².(2)  Lab Results   Component Value Date    WBC 8.4 04/24/2023    RBC 4.99 04/24/2023    HGB 14.7 04/24/2023    HCT 44.4 04/24/2023    MCV 89.0 04/24/2023    MCH 29.5 04/24/2023    MCHC 33.1 04/24/2023    RDW 14.0 04/24/2023    .0 04/24/2023    MPV 7.8 01/21/2014      Lab Results   Component Value Date    GLU 91 04/24/2023    BUN 9 04/24/2023    BUNCREA 12.3 04/24/2023    CREATSERUM 0.73 04/24/2023    ANIONGAP 6 04/24/2023    GFRNAA 93 02/24/2022    GFRAA 107 02/24/2022    CA 8.7 04/24/2023    OSMOCALC 290 04/24/2023    ALKPHO 91 04/24/2023    AST 12 (L) 04/24/2023    ALT 18 04/24/2023    BILT 0.4 04/24/2023    TP 7.9 04/24/2023    ALB 3.8 04/24/2023    GLOBULIN 4.1 04/24/2023     04/24/2023    K 3.7 04/24/2023     04/24/2023    CO2 29.0 04/24/2023      Lab Results   Component Value Date     08/16/2019    A1C 5.5 08/16/2019      Lab Results   Component Value Date    CHOLEST 184 04/24/2023    TRIG  160 (H) 04/24/2023    HDL 21 (L) 04/24/2023     (H) 04/24/2023    VLDL 29 04/24/2023    NONHDLC 163 (H) 04/24/2023      Lab Results   Component Value Date    TSH 1.220 04/24/2023                ASSESSMENT/PLAN:     Problem List Items Addressed This Visit       Abnormality of breast on screening mammography - Primary    Relevant Orders    [facility] DI[facility]ST[facility] (CPT=77066)    Primary hypertension     Blood pressure (!) 163/85, pulse 77, height 5' 4\" (1.626 m), weight 187 lb (84.8 kg), last menstrual period 07/15/2022, SpO2 97%.    Plan  Start amlodipine 5mg po daily  Return in 2 weeks for blood pressure check         Relevant Medications    amLODIPine 5 MG Oral Tab    Rash     Hx of rash over entire body in 2020.   Today dry scaly rash on left areola   It is itchy.  In 2021 she had some abnormality of her right breast. She was suppose to follow up in six months. In 2022, she admitted to not following up and stated she would complete this. This was not completed    Plan  Wash breast with soap and water  Apply vasaline daily  STAT diagnostic mammogram of both breast  Follow up with Dr. Ovalle             Relevant Orders    SURGERY - INTERNAL          No orders of the defined types were placed in this encounter.      Meds This Visit:  Requested Prescriptions     Signed Prescriptions Disp Refills    amLODIPine 5 MG Oral Tab 30 tablet 5     Sig: Take 1 tablet (5 mg total) by mouth daily.       Imaging & Referrals:  SURGERY - INTERNAL  [facility] DIAGNOSTIC BILATER[facility] (MSC=[id number])         CIRO Robb

## 2024-04-16 NOTE — ASSESSMENT & PLAN NOTE
Hx of rash over entire body in 2020.   Today dry scaly rash on left areola   It is itchy.  In 2021 she had some abnormality of her right breast. She was suppose to follow up in six months. In 2022, she admitted to not following up and stated she would complete this. This was not completed    Plan  Wash breast with soap and water  Apply vasaline daily  STAT diagnostic mammogram of both breast  Follow up with Dr. Ovalle

## 2024-04-29 ENCOUNTER — OFFICE VISIT (OUTPATIENT)
Dept: INTERNAL MEDICINE CLINIC | Facility: CLINIC | Age: 44
End: 2024-04-29
Payer: COMMERCIAL

## 2024-04-29 VITALS
HEIGHT: 64 IN | BODY MASS INDEX: 32.27 KG/M2 | DIASTOLIC BLOOD PRESSURE: 82 MMHG | SYSTOLIC BLOOD PRESSURE: 120 MMHG | HEART RATE: 87 BPM | WEIGHT: 189 LBS

## 2024-04-29 DIAGNOSIS — R21 RASH: Primary | ICD-10-CM

## 2024-04-29 DIAGNOSIS — I10 PRIMARY HYPERTENSION: ICD-10-CM

## 2024-04-29 PROCEDURE — 3074F SYST BP LT 130 MM HG: CPT | Performed by: NURSE PRACTITIONER

## 2024-04-29 PROCEDURE — 3008F BODY MASS INDEX DOCD: CPT | Performed by: NURSE PRACTITIONER

## 2024-04-29 PROCEDURE — 99214 OFFICE O/P EST MOD 30 MIN: CPT | Performed by: NURSE PRACTITIONER

## 2024-04-29 PROCEDURE — 3079F DIAST BP 80-89 MM HG: CPT | Performed by: NURSE PRACTITIONER

## 2024-04-29 NOTE — ASSESSMENT & PLAN NOTE
She has been applying OTC steroid cream to left breast areola.    She is seeing improvement in rash.    Diagnostic mammogram tomorrow    Follow up with Dr. Ovalle if there is any abnormalities

## 2024-04-29 NOTE — PROGRESS NOTES
HPI:    Patient ID: Stacie Milian is a 44 year old female.    HPI Follow up on Hypertension  Blood pressure 120/82, pulse 87, height 5' 4\" (1.626 m), weight 189 lb (85.7 kg), last menstrual period 07/15/2022.     B/P repeat 120/82  Doing well on amlodipine    Breast Rash  Improved.      Immunization History   Administered Date(s) Administered    Covid-19 Vaccine Velia (J&J) 0.5ml 03/26/2021, 02/10/2022    FLUZONE 6 months and older PFS 0.5 ml (81439) 01/15/2024    Influenza 10/14/2008    TDAP 01/15/2014       Past Medical History:    Anxiety state    over the past three months getting \"panic attacks\"     Back problem    cramps in lower back \"due to fibroids\"     Migraines    Uterine leiomyoma      Past Surgical History:   Procedure Laterality Date    Hysterectomy  08/01/2022    MEA BSO    Total abdominal hysterectomy N/A 8/1/2022    Procedure: TOTAL ABDOMINAL HYSTERECTOMY-BILATERAL SALPINGECTOMY;  Surgeon: Ana Burgos MD;  Location: [facility] MAIN OR      Social History     Socioeconomic History    Marital status: Life Partner   Tobacco Use    Smoking status: Never    Smokeless tobacco: Never   Vaping Use    Vaping status: Never Used   Substance and Sexual Activity    Alcohol use: Never    Drug use: Never          Review of Systems   Constitutional:  Negative for chills, fatigue and fever.   HENT:  Negative for congestion, ear discharge, ear pain, facial swelling, hearing loss, postnasal drip, sinus pressure, sore throat and trouble swallowing.    Eyes:  Negative for pain, discharge, redness and visual disturbance.   Respiratory:  Negative for cough, chest tightness, shortness of breath and wheezing.    Cardiovascular:  Negative for chest pain, palpitations and leg swelling.   Gastrointestinal:  Negative for abdominal distention, abdominal pain, constipation, diarrhea, nausea and vomiting.   Endocrine: Negative for cold intolerance, heat intolerance, polydipsia, polyphagia and polyuria.   Genitourinary:   Negative for difficulty urinating, dysuria, pelvic pain and vaginal bleeding.   Musculoskeletal:  Negative for back pain, gait problem, neck pain and neck stiffness.   Skin:  Positive for rash (Left aerola breast rash improved). Negative for color change.   Neurological:  Negative for dizziness, seizures, weakness and headaches.   Psychiatric/Behavioral:  Negative for agitation and sleep disturbance. The patient is not nervous/anxious.               Current Outpatient Medications   Medication Sig Dispense Refill    amLODIPine 5 MG Oral Tab Take 1 tablet (5 mg total) by mouth daily. 30 tablet 5    ibuprofen 600 MG Oral Tab Take 1 tablet (600 mg total) by mouth every 6 (six) hours. 30 tablet 1    acetaminophen 500 MG Oral Tab Take 2 tablets (1,000 mg total) by mouth every 6 (six) hours as needed. 30 tablet 0    tiZANidine 2 MG Oral Tab 1-2 tabs at bedtime as needed for pain. (Patient not taking: Reported on 2/6/2023) 40 tablet 0     Allergies:No Known Allergies   PHYSICAL EXAM:   Physical Exam  Constitutional:       Appearance: Normal appearance. She is well-developed.   HENT:      Head: Normocephalic.      Right Ear: Tympanic membrane normal.      Left Ear: Tympanic membrane normal.      Nose: Nose normal.      Mouth/Throat:      Mouth: Mucous membranes are moist.      Pharynx: No oropharyngeal exudate or posterior oropharyngeal erythema.   Eyes:      General:         Right eye: No discharge.         Left eye: No discharge.      Pupils: Pupils are equal, round, and reactive to light.   Cardiovascular:      Rate and Rhythm: Normal rate and regular rhythm.      Heart sounds: Normal heart sounds. No murmur heard.     No friction rub. No gallop.   Pulmonary:      Effort: Pulmonary effort is normal. No respiratory distress.      Breath sounds: Normal breath sounds. No wheezing, rhonchi or rales.   Chest:       Abdominal:      General: Bowel sounds are normal. There is no distension.      Palpations: Abdomen is soft. There  is no mass.      Tenderness: There is no abdominal tenderness. There is no right CVA tenderness, left CVA tenderness or guarding.   Musculoskeletal:         General: No tenderness.      Cervical back: Normal range of motion and neck supple. No tenderness.      Right lower leg: No edema.      Left lower leg: No edema.   Lymphadenopathy:      Cervical: No cervical adenopathy.   Skin:     General: Skin is warm and dry.      Findings: No rash.   Neurological:      Mental Status: She is alert and oriented to person, place, and time.      Coordination: Coordination normal.      Gait: Gait normal.   Psychiatric:         Mood and Affect: Mood normal.         Behavior: Behavior normal.         Thought Content: Thought content normal.         Judgment: Judgment normal.       /82   Pulse 87   Ht 5' 4\" (1.626 m)   Wt 189 lb (85.7 kg)   LMP 07/15/2022   BMI 32.44 kg/m²   Wt Readings from Last 2 Encounters:   04/29/24 189 lb (85.7 kg)   04/16/24 187 lb (84.8 kg)     Body mass index is 32.44 kg/m².(2)  Lab Results   Component Value Date    WBC 8.4 04/24/2023    RBC 4.99 04/24/2023    HGB 14.7 04/24/2023    HCT 44.4 04/24/2023    MCV 89.0 04/24/2023    MCH 29.5 04/24/2023    MCHC 33.1 04/24/2023    RDW 14.0 04/24/2023    .0 04/24/2023    MPV 7.8 01/21/2014      Lab Results   Component Value Date    GLU 91 04/24/2023    BUN 9 04/24/2023    BUNCREA 12.3 04/24/2023    CREATSERUM 0.73 04/24/2023    ANIONGAP 6 04/24/2023    GFRNAA 93 02/24/2022    GFRAA 107 02/24/2022    CA 8.7 04/24/2023    OSMOCALC 290 04/24/2023    ALKPHO 91 04/24/2023    AST 12 (L) 04/24/2023    ALT 18 04/24/2023    BILT 0.4 04/24/2023    TP 7.9 04/24/2023    ALB 3.8 04/24/2023    GLOBULIN 4.1 04/24/2023     04/24/2023    K 3.7 04/24/2023     04/24/2023    CO2 29.0 04/24/2023      Lab Results   Component Value Date     08/16/2019    A1C 5.5 08/16/2019      Lab Results   Component Value Date    CHOLEST 184 04/24/2023    TRIG 160  (H) 04/24/2023    HDL 21 (L) 04/24/2023     (H) 04/24/2023    VLDL 29 04/24/2023    NONHDLC 163 (H) 04/24/2023      Lab Results   Component Value Date    TSH 1.220 04/24/2023                ASSESSMENT/PLAN:     Problem List Items Addressed This Visit       Primary hypertension     Blood pressure 120/82, pulse 87, height 5' 4\" (1.626 m), weight 189 lb (85.7 kg), last menstrual period 07/15/2022.     Plan  Continue amlodipine  5mg po daily  Low salt diet         Rash - Primary     She has been applying OTC steroid cream to left breast areola.    She is seeing improvement in rash.    Diagnostic mammogram tomorrow    Follow up with Dr. Ovalle if there is any abnormalities                 No orders of the defined types were placed in this encounter.      Meds This Visit:  Requested Prescriptions      No prescriptions requested or ordered in this encounter       Imaging & Referrals:  None         CIRO Robb

## 2024-04-29 NOTE — ASSESSMENT & PLAN NOTE
Blood pressure 120/82, pulse 87, height 5' 4\" (1.626 m), weight 189 lb (85.7 kg), last menstrual period 07/15/2022.     Plan  Continue amlodipine  5mg po daily  Low salt diet

## 2024-04-30 ENCOUNTER — HOSPITAL ENCOUNTER (OUTPATIENT)
Dept: MAMMOGRAPHY | Facility: HOSPITAL | Age: 44
Discharge: HOME | End: 2024-04-30
Attending: NURSE PRACTITIONER
Payer: COMMERCIAL

## 2024-04-30 DIAGNOSIS — R92.8 ABNORMALITY OF BREAST ON SCREENING MAMMOGRAPHY: ICD-10-CM

## 2024-04-30 PROCEDURE — 77062 BREAST TOMOSYNTHESIS BI: CPT | Performed by: NURSE PRACTITIONER

## 2024-04-30 PROCEDURE — 77066 DX MAMMO INCL CAD BI: CPT | Performed by: NURSE PRACTITIONER

## 2024-05-09 ENCOUNTER — OFFICE VISIT (OUTPATIENT)
Dept: INTERNAL MEDICINE CLINIC | Facility: CLINIC | Age: 44
End: 2024-05-09
Payer: COMMERCIAL

## 2024-05-09 ENCOUNTER — NURSE TRIAGE (OUTPATIENT)
Dept: INTERNAL MEDICINE CLINIC | Facility: CLINIC | Age: 44
End: 2024-05-09

## 2024-05-09 VITALS
SYSTOLIC BLOOD PRESSURE: 152 MMHG | DIASTOLIC BLOOD PRESSURE: 89 MMHG | HEART RATE: 81 BPM | OXYGEN SATURATION: 99 % | HEIGHT: 64 IN | WEIGHT: 189 LBS | BODY MASS INDEX: 32.27 KG/M2

## 2024-05-09 DIAGNOSIS — R42 DIZZINESS: ICD-10-CM

## 2024-05-09 DIAGNOSIS — I10 PRIMARY HYPERTENSION: Primary | ICD-10-CM

## 2024-05-09 PROCEDURE — 3077F SYST BP >= 140 MM HG: CPT | Performed by: NURSE PRACTITIONER

## 2024-05-09 PROCEDURE — 99214 OFFICE O/P EST MOD 30 MIN: CPT | Performed by: NURSE PRACTITIONER

## 2024-05-09 PROCEDURE — 3079F DIAST BP 80-89 MM HG: CPT | Performed by: NURSE PRACTITIONER

## 2024-05-09 PROCEDURE — 3008F BODY MASS INDEX DOCD: CPT | Performed by: NURSE PRACTITIONER

## 2024-05-09 NOTE — ASSESSMENT & PLAN NOTE
Blood pressure 152/89, pulse 81, height 5' 4\" (1.626 m), weight 189 lb (85.7 kg), last menstrual period 07/15/2022, SpO2 99%.  Repeat- 132/80  She takes amlodipine 5mg daily    Plan  Hydrate  Continue amlodipine 5mg daily. Will not increase at this time due to dizzy spell and normalization of B/P

## 2024-05-09 NOTE — PATIENT INSTRUCTIONS
Controlling High Blood Pressure   High blood pressure (hypertension) is often called the silent killer. This is because many people who have it, don’t know it. It can be very dangerous. High blood pressure can raise your risk of heart attack, stroke, heart disease, and heart failure. Controlling your blood pressure can lower your risk of these problems. It's important to check yourblood pressure regularly. It can save your life.   Blood pressure measurements are given as 2 numbers. Systolic blood pressure is the upper number. This is the pressure when the heart contracts. Diastolic blood pressure is the lower number. This is the pressure when the heartrelaxes between beats.   Blood pressure is grouped like this:   Normal blood pressure. This is systolic of less than 120 and diastolic of less than 80 (120/80).  Elevated blood pressure.  This is systolic of 120 to 129 and diastolic less than 80.  Stage 1 high blood pressure.  This is systolic of 130 to 139 or diastolic between 80 to 89.  Stage 2 high blood pressure.  This is systolic of 140 or higher or diastolic of 90 or higher.  A heart-healthy lifestyle can help you control your blood pressure withoutmedicines. Below are some things you can do to have a heart-healthy lifestyle.     Eat heart-healthy foods   Choose low-salt, low-fat foods. Limit your sodium to 2,300 mg per day or the amount advised by your healthcare provider.  Limit canned, dried, cured, packaged, and fast foods. These can contain a lot of salt.  Eat 8 to 10 servings of fruits and vegetables every day.  Choose lean meats, fish, or chicken.  Eat whole-grain pasta, brown rice, and beans.  Eat 2 to 3 servings of low-fat or fat-free dairy products.  Ask your doctor about the DASH eating plan. This plan helps reduce blood pressure.  When you go to a restaurant, ask that your meal be made with no added salt.    Stay at a healthy weight   Ask your healthcare provider how many calories to eat a day. Then  stick to that number.  Ask your provider what weight range is healthiest for you. If you are overweight, a weight loss of only 3% to 5% of your body weight can help lower blood pressure. A good weight loss goal is to lose 10% of your body weight in a year.  Limit snacks and sweets.  Get regular exercise.    Get more active   Find activities you enjoy. They can be done alone or with friends or family. Try bicycling, dancing, walking, or jogging.  Park farther away from building entrances to walk more.  Use stairs instead of the elevator.  When you can, walk or bike instead of driving.  [place] leaves, garden, or do household repairs.  Be active at a moderate to vigorous level of physical activity for at least 30 minutes a day for at least 5 days a week.     Manage stress   Make time to relax and enjoy life. Find time to laugh.  Talk about your concerns with your loved ones and your healthcare provider.  Visit with family and friends, and keep up with hobbies.    Limit alcohol and quit smoking   Men should have no more than 2 drinks per day.  Women should have no more than 1 drink per day.  If you smoke, make a plan to stop. Talk with your healthcare provider for help. Smoking greatly raises your risk for heart disease and stroke. Ask your provider about stop-smoking programs and other support.    Blood pressure medicines  If your lifestyle changes aren’t enough, your healthcare provider may prescribe high blood pressure medicine. Take all medicines as prescribed. If you have any questions about yourmedicines, ask your provider before stopping or changing them.   [email or web address] last reviewed this educational content on12/1/2021 © 2000-2022 [employer]. All rights reserved. This information is not intended as a substitute for professional medical care. Always follow your[facility] professional's instruction    Video HealthSheets™  What is High Blood Pressure?  Understand what blood pressure is, the health risks  of having high blood pressure, the factors that put you at risk for having high blood pressure, and the importance of working with your healthcare provider to control it.  To watch the video:  Scan the QR code  Using your mobile device, scan the following code:  OR  Go to the website:  Movolo.com  Enter the prescription code:  3414E    © 2000-2022 [employer]. All rights reserved. This information is not intended as a substitute for professional medical care. Always follow your healthcare professional's instructions.    Video Scandlines  Eating Well with High Blood Pressure  Certain foods can make your blood pressure go too high. Watch and learn how easy it is to have delicious meals without harming your health.     To watch the video:  Scan the QR code  Using your mobile device, scan the following code:  OR  Go to the website:  www.kramesvideo.com  Enter the prescription code:   QIX       © 2000-2022 [employer]. All rights reserved. This information is not intended as a substitute for professional medical care. Always follow your healthcare professional's instructions.    Taking Your Blood Pressure  Blood pressure is the force of blood against the artery wall as it moves from the heart through the blood vessels. You can take your own blood pressure reading using a digital monitor. Take your readings the same each time, usingthe same arm. Take readings as often as your healthcare provider advises.   About blood pressure monitors  Blood pressure monitors are designed for certain ages and cases. You can find monitors for older adults, for pregnant women, and for children. Make sure theone you choose is the right one for your age and situation.   Experts advise an automatic cuff monitor that fits on your upper arm (bicep). The cuff should fit your arm size. A cuff that’s too large or too small won't give an accurate reading. Measure around yourupper arm to find your  size.   Monitors that attach to your finger or wrist are not as accurate as monitorsfor your upper arm.   Ask your healthcare provider for help in choosing a monitor. Bring your monitorto your next provider visit if you need help in using it the correct way.   The steps below are general instructions for using an automatic digitalmonitor.   Step 1. Relax    Take your blood pressure at the same time every day, such as in the morning or evening. Or take it at the time your healthcare provider advises.  Wait at least 30 minutes after smoking, eating, or exercising. Don't drink coffee, tea, soda, or other caffeinated drinks before checking your blood pressure. Use the restroom beforehand.  Sit comfortably at a table with both feet on the floor. Don't cross your legs or feet. Place the monitor near you.  Rest for at least 5 minutes before you begin. Make sure there are no distractions. This includes TV, cell phones, and other electronics. Wait to have conversations with others until after you measure you blood pressure.  Step 2. Wrap the cuff    Place your arm on the table, palm up. Your arm should be at the level of your heart. Wrap the cuff around your upper arm, just above your elbow. It’s best done on bare skin, not over clothing. Most cuffs will show you where the blood vessel in the middle of the arm at the inner side of the elbow (the brachial artery) should line up with the cuff. Look in your monitor's instruction booklet for an illustration. You can also bring your cuff to your healthcare provider and have them show you how to correctly place the cuff.  Step 3. Inflate the cuff    Push the button that starts the pump.  The cuff will tighten, then loosen.  The numbers will change. When they stop changing, your blood pressure reading will appear.  Take 2 or 3 readings 1 minute apart, or as advised by your provider.  Step 4. Write down the results of each reading    Write down your blood pressure numbers for each  reading. Note the date and time. Keep your results in 1 place, such as a notebook. Even if your monitor has a built-in memory, keep a hard copy of the readings.  Remove the cuff from your arm. Turn off the machine.  Bring your blood pressure records with you to each provider visit.  If you start a new blood pressure medicine, note the day you started the new medicine. Also note the day if you change the dose of your medicine. Measure your blood pressure before your take your medicine. This information goes on your blood pressure recording sheet. This will help your provider check how well the medicine changes are working.  Ask your provider what numbers mean that you should call them. Also ask what numbers mean that you should get help right away.  [place] last reviewed this educational content on12/1/2021 © 2000-2022 [employer]. All rights reserved. This information is not intended as a substitute for professional medical care. Always follow yourhealthcare professional's instructions.

## 2024-05-09 NOTE — TELEPHONE ENCOUNTER
Patient was started on blood pressure medicine last month was doing well with 2 week follow up, yesterday felt dizzy, face was pale, blood pressure elevated 159/101, hands felt numb , mentioned she became panic , today 135/86 still feel she may become panic again -appointment made for evaluation, mentioned may not have drank enough water

## 2024-05-09 NOTE — PROGRESS NOTES
HPI:    Patient ID: Stacie Milian is a 44 year old female.    [employer]    Dizziness (Pt c/o dizziness yesterday and checked her bp right after it was 158//101.)  HPI Hypertension  44 year old female who had a dizzy spell last night around 7 P.M.  She felt like she was going to pass out.  She recovered after 40 minutes  She had a stressful day at work.    Blood pressure 152/89, pulse 81, height 5' 4\" (1.626 m), weight 189 lb (85.7 kg), last menstrual period 07/15/2022, SpO2 99%.  Repeat- 132/80  She takes amlodipine 5mg daily    Anxiety    Ruptured TM several months ago      Immunization History   Administered Date(s) Administered    Covid-19 Vaccine Process Data Control (J&J) 0.5ml 03/26/2021, 02/10/2022    FLUZONE 6 months and older PFS 0.5 ml (77373) 01/15/2024    Influenza 10/14/2008    TDAP 01/15/2014       Past Medical History:    Anxiety state    over the past three months getting \"panic attacks\"     Back problem    cramps in lower back \"due to fibroids\"     Migraines    Uterine leiomyoma      Past Surgical History:   Procedure Laterality Date    Hysterectomy  08/01/2022    MAE BSO    Total abdominal hysterectomy N/A 8/1/2022    Procedure: TOTAL ABDOMINAL HYSTERECTOMY-BILATERAL SALPINGECTOMY;  Surgeon: Ana Burgos MD;  Location: [facility] MAIN OR      Social History     Socioeconomic History    Marital status: Life Partner   Tobacco Use    Smoking status: Never    Smokeless tobacco: Never   Vaping Use    Vaping status: Never Used   Substance and Sexual Activity    Alcohol use: Never    Drug use: Never          Review of Systems   Constitutional:  Negative for chills, fatigue and fever.   HENT:  Negative for ear pain, hearing loss, sinus pain, sore throat and trouble swallowing.    Eyes:  Negative for pain and visual disturbance.   Respiratory:  Negative for cough, chest tightness and shortness of breath.    Cardiovascular:  Negative for chest pain, palpitations and leg swelling.   Gastrointestinal:   Negative for abdominal pain, constipation, diarrhea, nausea and vomiting.   Endocrine: Negative for cold intolerance and heat intolerance.   Genitourinary:  Negative for dysuria and hematuria.   Musculoskeletal:  Negative for back pain and joint swelling.   Skin:  Negative for rash.   Allergic/Immunologic: Negative for environmental allergies.   Neurological:  Negative for weakness, numbness and headaches.   Hematological:  Does not bruise/bleed easily.   Psychiatric/Behavioral:  Negative for dysphoric mood and sleep disturbance. The patient is not nervous/anxious.               Current Outpatient Medications   Medication Sig Dispense Refill    amLODIPine 5 MG Oral Tab Take 1 tablet (5 mg total) by mouth daily. 30 tablet 5    ibuprofen 600 MG Oral Tab Take 1 tablet (600 mg total) by mouth every 6 (six) hours. 30 tablet 1    acetaminophen 500 MG Oral Tab Take 2 tablets (1,000 mg total) by mouth every 6 (six) hours as needed. 30 tablet 0    tiZANidine 2 MG Oral Tab 1-2 tabs at bedtime as needed for pain. (Patient not taking: Reported on 2/6/2023) 40 tablet 0     Allergies:No Known Allergies   PHYSICAL EXAM:   Physical Exam  Constitutional:       Appearance: Normal appearance. She is well-developed.   HENT:      Head: Normocephalic.   Cardiovascular:      Rate and Rhythm: Normal rate and regular rhythm.      Heart sounds: Normal heart sounds. No murmur heard.     No friction rub. No gallop.   Pulmonary:      Effort: Pulmonary effort is normal. No respiratory distress.      Breath sounds: Normal breath sounds. No wheezing, rhonchi or rales.   Abdominal:      General: Bowel sounds are normal. There is no distension.      Palpations: Abdomen is soft. There is no mass.      Tenderness: There is no abdominal tenderness. There is no right CVA tenderness, left CVA tenderness or guarding.   Musculoskeletal:         General: No tenderness.      Cervical back: Normal range of motion and neck supple. No tenderness.      Right  lower leg: No edema.      Left lower leg: No edema.   Lymphadenopathy:      Cervical: No cervical adenopathy.   Skin:     General: Skin is warm and dry.      Findings: No rash.   Neurological:      Mental Status: She is alert and oriented to person, place, and time.      Coordination: Coordination normal.      Gait: Gait normal.   Psychiatric:         Mood and Affect: Mood normal.         Behavior: Behavior normal.         Thought Content: Thought content normal.         Judgment: Judgment normal.       /89 (BP Location: Right arm, Patient Position: Sitting)   Pulse 81   Ht 5' 4\" (1.626 m)   Wt 189 lb (85.7 kg)   LMP 07/15/2022   SpO2 99%   BMI 32.44 kg/m²   Wt Readings from Last 2 Encounters:   05/09/24 189 lb (85.7 kg)   04/29/24 189 lb (85.7 kg)     Body mass index is 32.44 kg/m².(2)  Lab Results   Component Value Date    WBC 8.4 04/24/2023    RBC 4.99 04/24/2023    HGB 14.7 04/24/2023    HCT 44.4 04/24/2023    MCV 89.0 04/24/2023    MCH 29.5 04/24/2023    MCHC 33.1 04/24/2023    RDW 14.0 04/24/2023    .0 04/24/2023    MPV 7.8 01/21/2014      Lab Results   Component Value Date    GLU 91 04/24/2023    BUN 9 04/24/2023    BUNCREA 12.3 04/24/2023    CREATSERUM 0.73 04/24/2023    ANIONGAP 6 04/24/2023    GFRNAA 93 02/24/2022    GFRAA 107 02/24/2022    CA 8.7 04/24/2023    OSMOCALC 290 04/24/2023    ALKPHO 91 04/24/2023    AST 12 (L) 04/24/2023    ALT 18 04/24/2023    BILT 0.4 04/24/2023    TP 7.9 04/24/2023    ALB 3.8 04/24/2023    GLOBULIN 4.1 04/24/2023     04/24/2023    K 3.7 04/24/2023     04/24/2023    CO2 29.0 04/24/2023      Lab Results   Component Value Date     08/16/2019    A1C 5.5 08/16/2019      Lab Results   Component Value Date    CHOLEST 184 04/24/2023    TRIG 160 (H) 04/24/2023    HDL 21 (L) 04/24/2023     (H) 04/24/2023    VLDL 29 04/24/2023    NONHDLC 163 (H) 04/24/2023      Lab Results   Component Value Date    TSH 1.220 04/24/2023                 ASSESSMENT/PLAN:     Problem List Items Addressed This Visit       Primary hypertension - Primary     Blood pressure 152/89, pulse 81, height 5' 4\" (1.626 m), weight 189 lb (85.7 kg), last menstrual period 07/15/2022, SpO2 99%.  Repeat- 132/80  She takes amlodipine 5mg daily    Plan  Hydrate  Continue amlodipine 5mg daily. Will not increase at this time due to dizzy spell and normalization of B/P          Other Visit Diagnoses       Dizziness            Heart RRR  No dizziness at vist  No ear infection noted  Monitor    No orders of the defined types were placed in this encounter.      Meds This Visit:  Requested Prescriptions      No prescriptions requested or ordered in this encounter       Imaging & Referrals:  None         CIRO Robb

## 2024-06-13 ENCOUNTER — TELEPHONE (OUTPATIENT)
Dept: INTERNAL MEDICINE CLINIC | Facility: CLINIC | Age: 44
End: 2024-06-13

## 2024-06-13 ENCOUNTER — TELEPHONE (OUTPATIENT)
Dept: SURGERY | Facility: CLINIC | Age: 44
End: 2024-06-13

## 2024-06-13 NOTE — TELEPHONE ENCOUNTER
CIRO Medrano - please confirm if patient should continue amlodipine daily until appointment 6/17/24 due to side effects?    Spoke to patient, full name and date of birth verified.    Condition update: Patient stated she is upset that her appointment was cancelled with Dr. Ovalle tomorrow and first available is 1/8/25.     Patient is requesting a new recommendation on who to see for her breast rash.     Patient then stated that she has experienced feeling as if she is going to faint, chest tightness, rapid heart beat, one recorded pulse of 105 when this was happening.     Patient attributes this to amlodipine because there were 2 times where patient did not take the medication and the symptoms resolved.     Scheduled sooner appointment with Sophia Medrano for 6/17/24.

## 2024-06-13 NOTE — TELEPHONE ENCOUNTER
She can hold the medication until I see her and we can assess the Rash and I will have her see another breast specialist.    See her soon    Sophia Medrano DNP  Working with

## 2024-06-13 NOTE — TELEPHONE ENCOUNTER
Spoke with patient regarding her schedule change due to Dr. Ovalle being in surgery.  Patient was moved to Jan 8, 2025 at 8:30am.  Patient states she will reach out to her PCP regarding the rash.     Patient agreed to appt.

## 2024-06-17 ENCOUNTER — OFFICE VISIT (OUTPATIENT)
Dept: INTERNAL MEDICINE CLINIC | Facility: CLINIC | Age: 44
End: 2024-06-17
Payer: COMMERCIAL

## 2024-06-17 VITALS
HEART RATE: 87 BPM | BODY MASS INDEX: 32.27 KG/M2 | HEIGHT: 64 IN | SYSTOLIC BLOOD PRESSURE: 155 MMHG | DIASTOLIC BLOOD PRESSURE: 95 MMHG | WEIGHT: 189 LBS

## 2024-06-17 DIAGNOSIS — R21 RASH: ICD-10-CM

## 2024-06-17 DIAGNOSIS — I10 PRIMARY HYPERTENSION: ICD-10-CM

## 2024-06-17 DIAGNOSIS — Z00.00 ANNUAL PHYSICAL EXAM: Primary | ICD-10-CM

## 2024-06-17 RX ORDER — LOSARTAN POTASSIUM 25 MG/1
25 TABLET ORAL DAILY
Qty: 30 TABLET | Refills: 0 | Status: SHIPPED | OUTPATIENT
Start: 2024-06-17

## 2024-06-17 NOTE — PROGRESS NOTES
HPI:    Patient ID: Stacie Milian is a 44 year old female.    HPI  Follow up on elevated blood pressure and breast rash.    Hypertension  Blood pressure (!) 155/95, pulse 87, height 5' 4\" (1.626 m), weight 189 lb (85.7 kg), last menstrual period 07/15/2022.   Patient felt dizzy with amlodipine  B/P 140/90 at home    Breast Rash  Rash on left nipple is improving  Same time of rash stared on right nipple and it is itchy.    Immunization History   Administered Date(s) Administered    Covid-19 Vaccine [employer] ([employer]J) 0.5ml 03/26/2021, 02/10/2022    FLUZONE 6 months and older PFS 0.5 ml (75952) 01/15/2024    Influenza 10/14/2008    TDAP 01/15/2014       Past Medical History:    Anxiety state    over the past three months getting \"panic attacks\"     Back problem    cramps in lower back \"due to fibroids\"     Migraines    Uterine leiomyoma      Past Surgical History:   Procedure Laterality Date    Hysterectomy  08/01/2022    MAE BSO    Total abdominal hysterectomy N/A 8/1/2022    Procedure: TOTAL ABDOMINAL HYSTERECTOMY-BILATERAL SALPINGECTOMY;  Surgeon: Ana Burgos MD;  Location: [facility] MAIN OR      Social History     Socioeconomic History    Marital status: Life Partner   Tobacco Use    Smoking status: Never    Smokeless tobacco: Never   Vaping Use    Vaping status: Never Used   Substance and Sexual Activity    Alcohol use: Never    Drug use: Never          Review of Systems   Constitutional:  Negative for chills, fatigue and fever.   HENT:  Negative for ear pain, hearing loss, sinus pain, sore throat and trouble swallowing.    Eyes:  Negative for pain and visual disturbance.   Respiratory:  Negative for cough, chest tightness and shortness of breath.    Cardiovascular:  Negative for chest pain, palpitations and leg swelling.   Gastrointestinal:  Negative for abdominal pain, constipation, diarrhea, nausea and vomiting.   Endocrine: Negative for cold intolerance and heat intolerance.   Genitourinary:  Negative for  dysuria and hematuria.   Musculoskeletal:  Negative for back pain and joint swelling.   Skin:  Negative for rash.   Allergic/Immunologic: Negative for environmental allergies.   Neurological:  Negative for weakness, numbness and headaches.   Hematological:  Does not bruise/bleed easily.   Psychiatric/Behavioral:  Negative for dysphoric mood and sleep disturbance. The patient is not nervous/anxious.               Current Outpatient Medications   Medication Sig Dispense Refill    losartan 25 MG Oral Tab Take 1 tablet (25 mg total) by mouth daily. 30 tablet 0    ibuprofen 600 MG Oral Tab Take 1 tablet (600 mg total) by mouth every 6 (six) hours. 30 tablet 1    acetaminophen 500 MG Oral Tab Take 2 tablets (1,000 mg total) by mouth every 6 (six) hours as needed. 30 tablet 0    tiZANidine 2 MG Oral Tab 1-2 tabs at bedtime as needed for pain. (Patient not taking: Reported on 2/6/2023) 40 tablet 0     Allergies:No Known Allergies   PHYSICAL EXAM:   Physical Exam  Constitutional:       Appearance: Normal appearance. She is well-developed.   HENT:      Head: Normocephalic.   Cardiovascular:      Rate and Rhythm: Normal rate and regular rhythm.      Heart sounds: Normal heart sounds. No murmur heard.     No friction rub. No gallop.   Pulmonary:      Effort: Pulmonary effort is normal. No respiratory distress.      Breath sounds: Normal breath sounds. No wheezing, rhonchi or rales.   Abdominal:      General: Bowel sounds are normal. There is no distension.      Palpations: Abdomen is soft. There is no mass.      Tenderness: There is no abdominal tenderness. There is no right CVA tenderness, left CVA tenderness or guarding.   Musculoskeletal:         General: No tenderness.      Cervical back: Normal range of motion and neck supple. No tenderness.      Right lower leg: No edema.      Left lower leg: No edema.   Lymphadenopathy:      Cervical: No cervical adenopathy.   Skin:     General: Skin is warm and dry.      Findings: No  rash.   Neurological:      Mental Status: She is alert and oriented to person, place, and time.      Coordination: Coordination normal.      Gait: Gait normal.   Psychiatric:         Mood and Affect: Mood normal.         Behavior: Behavior normal.         Thought Content: Thought content normal.         Judgment: Judgment normal.       BP (!) 155/95 (BP Location: Left arm, Patient Position: Sitting, Cuff Size: adult)   Pulse 87   Ht 5' 4\" (1.626 m)   Wt 189 lb (85.7 kg)   LMP 07/15/2022   BMI 32.44 kg/m²   Wt Readings from Last 2 Encounters:   06/17/24 189 lb (85.7 kg)   05/09/24 189 lb (85.7 kg)     Body mass index is 32.44 kg/m².(2)  Lab Results   Component Value Date    WBC 8.4 04/24/2023    RBC 4.99 04/24/2023    HGB 14.7 04/24/2023    HCT 44.4 04/24/2023    MCV 89.0 04/24/2023    MCH 29.5 04/24/2023    MCHC 33.1 04/24/2023    RDW 14.0 04/24/2023    .0 04/24/2023    MPV 7.8 01/21/2014      Lab Results   Component Value Date    GLU 91 04/24/2023    BUN 9 04/24/2023    BUNCREA 12.3 04/24/2023    CREATSERUM 0.73 04/24/2023    ANIONGAP 6 04/24/2023    GFRNAA 93 02/24/2022    GFRAA 107 02/24/2022    CA 8.7 04/24/2023    OSMOCALC 290 04/24/2023    ALKPHO 91 04/24/2023    AST 12 (L) 04/24/2023    ALT 18 04/24/2023    BILT 0.4 04/24/2023    TP 7.9 04/24/2023    ALB 3.8 04/24/2023    GLOBULIN 4.1 04/24/2023     04/24/2023    K 3.7 04/24/2023     04/24/2023    CO2 29.0 04/24/2023      Lab Results   Component Value Date     08/16/2019    A1C 5.5 08/16/2019      Lab Results   Component Value Date    CHOLEST 184 04/24/2023    TRIG 160 (H) 04/24/2023    HDL 21 (L) 04/24/2023     (H) 04/24/2023    VLDL 29 04/24/2023    NONHDLC 163 (H) 04/24/2023      Lab Results   Component Value Date    TSH 1.220 04/24/2023                ASSESSMENT/PLAN:     Problem List Items Addressed This Visit       Rash     Patient states the rash spread to right nipple  She states she could not get an appointment  till January.  Patient given referral to Anabella Nina         Relevant Orders    Adult Food Allergy Prof [E]    Primary hypertension     Patient said she did not tolerate the amlodipine.  It made her dizzy.  She held it for the past few days    Blood pressure (!) 155/95, pulse 87, height 5' 4\" (1.626 m), weight 189 lb (85.7 kg), last menstrual period 07/15/2022.     Plan  Discussed weight loss  Losartan 25mg po daily    To follow up in one month for B/P check and Annual Physical  Annual Labs ordered  Food allergy panel         Relevant Medications    losartan 25 MG Oral Tab    Annual physical exam - Primary    Relevant Orders    Comp Metabolic Panel (14)    Lipid Panel    Vitamin D, 25-Hydroxy    Vitamin B12    TSH W Reflex To Free T4    CBC, NO DIFFERENTIAL/PLATELET    Adult Food Allergy Prof [E]    Hemoglobin A1C [E]          Orders Placed This Encounter   Procedures    Comp Metabolic Panel (14)    Lipid Panel    Vitamin D, 25-Hydroxy    Vitamin B12    TSH W Reflex To Free T4    CBC, NO DIFFERENTIAL/PLATELET    Adult Food Allergy Prof [E]    Hemoglobin A1C [E]       Meds This Visit:  Requested Prescriptions     Signed Prescriptions Disp Refills    losartan 25 MG Oral Tab 30 tablet 0     Sig: Take 1 tablet (25 mg total) by mouth daily.       Imaging & Referrals:  None         CIRO Robb

## 2024-06-18 ENCOUNTER — HOSPITAL ENCOUNTER (EMERGENCY)
Facility: HOSPITAL | Age: 44
Discharge: HOME | End: 2024-06-18
Attending: EMERGENCY MEDICINE

## 2024-06-18 ENCOUNTER — APPOINTMENT (OUTPATIENT)
Dept: CT IMAGING | Facility: HOSPITAL | Age: 44
End: 2024-06-18
Attending: EMERGENCY MEDICINE

## 2024-06-18 VITALS
SYSTOLIC BLOOD PRESSURE: 154 MMHG | HEART RATE: 81 BPM | BODY MASS INDEX: 32 KG/M2 | DIASTOLIC BLOOD PRESSURE: 92 MMHG | RESPIRATION RATE: 16 BRPM | OXYGEN SATURATION: 99 % | TEMPERATURE: 98 F | WEIGHT: 188 LBS

## 2024-06-18 DIAGNOSIS — R55 SYNCOPE, NEAR: Primary | ICD-10-CM

## 2024-06-18 DIAGNOSIS — R51.9 ACUTE NONINTRACTABLE HEADACHE, UNSPECIFIED HEADACHE TYPE: ICD-10-CM

## 2024-06-18 DIAGNOSIS — I10 PRIMARY HYPERTENSION: ICD-10-CM

## 2024-06-18 LAB
ALBUMIN SERPL-MCNC: 4.9 G/DL (ref 3.2–4.8)
ALP LIVER SERPL-CCNC: 95 U/L
ALT SERPL-CCNC: 12 U/L
ANION GAP SERPL CALC-SCNC: 7 MMOL/L (ref 0–18)
AST SERPL-CCNC: 15 U/L (ref ?–34)
ATRIAL RATE: 81 BPM
BASOPHILS # BLD AUTO: 0.05 X10(3) UL (ref 0–0.2)
BASOPHILS NFR BLD AUTO: 0.4 %
BILIRUB DIRECT SERPL-MCNC: 0.2 MG/DL (ref ?–0.3)
BILIRUB SERPL-MCNC: 0.6 MG/DL (ref 0.3–1.2)
BUN BLD-MCNC: 6 MG/DL (ref 9–23)
BUN/CREAT SERPL: 8.3 (ref 10–20)
CALCIUM BLD-MCNC: 9.2 MG/DL (ref 8.7–10.4)
CHLORIDE SERPL-SCNC: 105 MMOL/L (ref 98–112)
CO2 SERPL-SCNC: 28 MMOL/L (ref 21–32)
CREAT BLD-MCNC: 0.72 MG/DL
DEPRECATED RDW RBC AUTO: 39.8 FL (ref 35.1–46.3)
EGFRCR SERPLBLD CKD-EPI 2021: 106 ML/MIN/1.73M2 (ref 60–?)
EOSINOPHIL # BLD AUTO: 0.11 X10(3) UL (ref 0–0.7)
EOSINOPHIL NFR BLD AUTO: 0.9 %
ERYTHROCYTE [DISTWIDTH] IN BLOOD BY AUTOMATED COUNT: 12.3 % (ref 11–15)
GLUCOSE BLD-MCNC: 97 MG/DL (ref 70–99)
HCT VFR BLD AUTO: 43.9 %
HGB BLD-MCNC: 15.1 G/DL
IMM GRANULOCYTES # BLD AUTO: 0.05 X10(3) UL (ref 0–1)
IMM GRANULOCYTES NFR BLD: 0.4 %
LYMPHOCYTES # BLD AUTO: 2.29 X10(3) UL (ref 1–4)
LYMPHOCYTES NFR BLD AUTO: 19.4 %
MCH RBC QN AUTO: 30.4 PG (ref 26–34)
MCHC RBC AUTO-ENTMCNC: 34.4 G/DL (ref 31–37)
MCV RBC AUTO: 88.5 FL
MONOCYTES # BLD AUTO: 0.57 X10(3) UL (ref 0.1–1)
MONOCYTES NFR BLD AUTO: 4.8 %
NEUTROPHILS # BLD AUTO: 8.72 X10 (3) UL (ref 1.5–7.7)
NEUTROPHILS # BLD AUTO: 8.72 X10(3) UL (ref 1.5–7.7)
NEUTROPHILS NFR BLD AUTO: 74.1 %
OSMOLALITY SERPL CALC.SUM OF ELEC: 288 MOSM/KG (ref 275–295)
P AXIS: 37 DEGREES
P-R INTERVAL: 148 MS
PLATELET # BLD AUTO: 278 10(3)UL (ref 150–450)
POTASSIUM SERPL-SCNC: 3.9 MMOL/L (ref 3.5–5.1)
PROT SERPL-MCNC: 8.1 G/DL (ref 5.7–8.2)
Q-T INTERVAL: 408 MS
QRS DURATION: 76 MS
QTC CALCULATION (BEZET): 473 MS
R AXIS: 30 DEGREES
RBC # BLD AUTO: 4.96 X10(6)UL
SODIUM SERPL-SCNC: 140 MMOL/L (ref 136–145)
T AXIS: 48 DEGREES
TROPONIN I SERPL HS-MCNC: <3 NG/L
VENTRICULAR RATE: 81 BPM
WBC # BLD AUTO: 11.8 X10(3) UL (ref 4–11)

## 2024-06-18 PROCEDURE — 70450 CT HEAD/BRAIN W/O DYE: CPT | Performed by: EMERGENCY MEDICINE

## 2024-06-18 PROCEDURE — 93005 ELECTROCARDIOGRAM TRACING: CPT

## 2024-06-18 PROCEDURE — 84484 ASSAY OF TROPONIN QUANT: CPT | Performed by: EMERGENCY MEDICINE

## 2024-06-18 PROCEDURE — 36415 COLL VENOUS BLD VENIPUNCTURE: CPT

## 2024-06-18 PROCEDURE — 80076 HEPATIC FUNCTION PANEL: CPT | Performed by: EMERGENCY MEDICINE

## 2024-06-18 PROCEDURE — 93010 ELECTROCARDIOGRAM REPORT: CPT

## 2024-06-18 PROCEDURE — 85025 COMPLETE CBC W/AUTO DIFF WBC: CPT | Performed by: EMERGENCY MEDICINE

## 2024-06-18 PROCEDURE — 80048 BASIC METABOLIC PNL TOTAL CA: CPT | Performed by: EMERGENCY MEDICINE

## 2024-06-18 PROCEDURE — 99285 EMERGENCY DEPT VISIT HI MDM: CPT

## 2024-06-18 PROCEDURE — 99284 EMERGENCY DEPT VISIT MOD MDM: CPT

## 2024-06-18 NOTE — ASSESSMENT & PLAN NOTE
Patient said she did not tolerate the amlodipine.  It made her dizzy.  She held it for the past few days    Blood pressure (!) 155/95, pulse 87, height 5' 4\" (1.626 m), weight 189 lb (85.7 kg), last menstrual period 07/15/2022.     Plan  Discussed weight loss  Losartan 25mg po daily    To follow up in one month for B/P check and Annual Physical  Annual Labs ordered  Food allergy panel

## 2024-06-18 NOTE — ED PROVIDER NOTES
Patient Seen in: [facility] Emergency Department    History     Chief Complaint   Patient presents with    Headache       HPI    44-year-old female presents to the ER with complaint of near syncopal episode approximately 10 AM this morning.  Patient with a past medical history anxiety and migraine headaches.  Patient states she was experiencing a 9 out of 10 headache where she felt she was going to pass out.  Patient also noted that she stopped taking her blood pressure medication 4 days ago because every time she took her medication as it made her feel lightheaded.  Patient was taking amlodipine 5 mg    History reviewed.   Past Medical History:    Anxiety state    over the past three months getting \"panic attacks\"     Back problem    cramps in lower back \"due to fibroids\"     Migraines    Uterine leiomyoma       History reviewed.   Past Surgical History:   Procedure Laterality Date    Hysterectomy  08/01/2022    MAE BSO    Total abdominal hysterectomy N/A 8/1/2022    Procedure: TOTAL ABDOMINAL HYSTERECTOMY-BILATERAL SALPINGECTOMY;  Surgeon: Ana Burgos MD;  Location: [facility] MAIN OR         Medications :  (Not in a hospital admission)       Family History   Problem Relation Age of Onset    Heart Disorder Father     Cancer Sister     Breast Cancer Sister 29       Smoking Status:   Social History     Socioeconomic History    Marital status: Life Partner   Tobacco Use    Smoking status: Never    Smokeless tobacco: Never   Vaping Use    Vaping status: Never Used   Substance and Sexual Activity    Alcohol use: Never    Drug use: Never       ROS  All pertinent positives for the review of systems are mentioned in the HPI  All other organ systems are reviewed and are negative.    Constitutional and vital signs reviewed.      Social History and Family History elements reviewed from today, pertinent positives to the presenting problem noted.    Physical Exam     ED Triage Vitals   BP 06/18/24 1412 (!) 170/103   Pulse  06/18/24 1412 94   Resp 06/18/24 1412 18   Temp 06/18/24 1412 98 °F (36.7 °C)   Temp src --    SpO2 06/18/24 1412 98 %   O2 Device 06/18/24 1544 None (Room air)       All measures to prevent infection transmission during my interaction with the patient were taken. The patient was already wearing a droplet mask on my arrival to the room. Personal protective equipment including droplet mask, eye protection, and gloves were worn throughout the duration of the exam.  Handwashing was performed prior to and after the exam.  Stethoscope and any equipment used during my examination was cleaned with super sani-cloth germicidal wipes following the exam.     Physical Exam  Vitals and nursing note reviewed.   Constitutional:       Appearance: She is well-developed.   HENT:      Head: Normocephalic and atraumatic.      Right Ear: External ear normal.      Left Ear: External ear normal.      Nose: Nose normal.   Eyes:      Conjunctiva/sclera: Conjunctivae normal.      Pupils: Pupils are equal, round, and reactive to light.   Cardiovascular:      Rate and Rhythm: Normal rate and regular rhythm.      Heart sounds: Normal heart sounds.   Pulmonary:      Effort: Pulmonary effort is normal.      Breath sounds: Normal breath sounds.   Abdominal:      General: Bowel sounds are normal.      Palpations: Abdomen is soft.   Musculoskeletal:         General: Normal range of motion.      Cervical back: Normal range of motion and neck supple.   Skin:     General: Skin is warm and dry.   Neurological:      General: No focal deficit present.      Mental Status: She is alert and oriented to person, place, and time.      Cranial Nerves: No cranial nerve deficit.      Sensory: No sensory deficit.      Motor: No weakness.      Coordination: Coordination normal.      Deep Tendon Reflexes: Reflexes are normal and symmetric.   Psychiatric:         Behavior: Behavior normal.         Thought Content: Thought content normal.         Judgment: Judgment  normal.         ED Course        Labs Reviewed   BASIC METABOLIC PANEL (8) - Abnormal; Notable for the following components:       Result Value    BUN 6 (*)     BUN/CREA Ratio 8.3 (*)     All other components within normal limits   HEPATIC FUNCTION PANEL (7) - Abnormal; Notable for the following components:    Albumin 4.9 (*)     All other components within normal limits   CBC W/ DIFFERENTIAL - Abnormal; Notable for the following components:    WBC 11.8 (*)     Neutrophil Absolute Prelim 8.72 (*)     Neutrophil Absolute 8.72 (*)     All other components within normal limits   TROPONIN I HIGH SENSITIVITY - Normal   CBC WITH DIFFERENTIAL WITH PLATELET    Narrative:     The following orders were created for panel order CBC With Differential With Platelet.                  Procedure                               Abnormality         Status                                     ---------                               -----------         ------                                     CBC W/ DIFFERENTIAL[[id number]]          Abnormal            Final result                                                 Please view results for these tests on the individual orders.     EKG    Rate, intervals and axes as noted on EKG Report.  Rate: 81  Rhythm: Sinus Rhythm  Reading: No ST deviation, normal axis.             Imaging Results Available and Reviewed while in ED: CT BRAIN OR HEAD (51700)    Result Date: 6/18/2024  CONCLUSION: No acute intracranial abnormality.    Dictated by (CST): Ministerio Kellogg MD on 6/18/2024 at 4:57 PM     Finalized by (CST): Ministerio Kellogg MD on 6/18/2024 at 4:59 PM         ED Medications Administered: Medications - No data to display      MDM     Vitals:    06/18/24 1412 06/18/24 1545 06/18/24 1645 06/18/24 1700   BP: (!) 170/103 (!) 172/95 (!) 154/100 (!) 154/92   Pulse: 94 77 83 78   Resp: 18 16 18 17   Temp: 98 °F (36.7 °C)      SpO2: 98% 99% 99% 99%   Weight: 85.3 kg        *I personally reviewed and interpreted  all ED vitals.  I also personally reviewed all labs and imaging if ordered    Pulse Ox: 98%, Room air, Normal     Monitor Interpretation:   normal sinus rhythm    Differential Diagnosis/ Diagnostic Considerations: Syncope, near syncope, accelerated hypertension, migraine headache,    Medical Record Review: I personally reviewed available prior medical records for any recent pertinent discharge summaries, testing, and procedures and reviewed those reports.    Complicating Factors: The patient already has does not have any pertinent problems on file. to contribute to the complexity of this ED evaluation.    Medical Decision Making  44-year-old female presents ER with complaint of near syncopal episode, headache as well as high blood pressure.  Patient states that the amlodipine she been taking and making her feel lightheaded so she stopped taking it 4 days ago.  Patient was recently just prescribed Cozaar but has not taken the medication yet.  Patient instructed to keep an eye on her blood pressures at home and if she remains normotensive to not take blood pressure medication as that could be the cause of her feeling lightheaded.  Patient okay to be discharged home.  Patient CT brain and blood work within normal limits.  Patient instructed to follow with her PCP if symptoms continue    Problems Addressed:  Acute nonintractable headache, unspecified headache type: acute illness or injury  Primary hypertension: acute illness or injury  Syncope, near: acute illness or injury    Amount and/or Complexity of Data Reviewed  External Data Reviewed:      Details: Patient notes reviewed.  Patient recently has a prescription placed for Cozaar 25 mg for her blood pressure.  Labs: ordered. Decision-making details documented in ED Course.  Radiology: ordered and independent interpretation performed. Decision-making details documented in ED Course.     Details: CT brain inter by myself shows no acute intracranial  process.        Condition upon leaving the department: Stable    Disposition and Plan     Clinical Impression:  1. Syncope, near    2. Acute nonintractable headache, unspecified headache type    3. Primary hypertension        Disposition:  Discharge    Follow-up:  Jack Farrar MD  [address] [place] [id number] [phone]    Schedule an appointment as soon as possible for a visit  for new bloo pressure medication      Medications Prescribed:  Current Discharge Medication List

## 2024-06-18 NOTE — ASSESSMENT & PLAN NOTE
Patient states the rash spread to right nipple  She states she could not get an appointment till January.  Patient given referral to Anabella Nina

## 2024-06-18 NOTE — ED INITIAL ASSESSMENT (HPI)
Patient complains of near syncope and meliton that occurred this antemeridian, states it comes and goes, complains of chest pressure, patient states all these symptoms began when she had a headache, states she also had bp medicine changes recently

## 2024-07-01 ENCOUNTER — LAB ENCOUNTER (OUTPATIENT)
Dept: LAB | Facility: HOSPITAL | Age: 44
End: 2024-07-01
Attending: NURSE PRACTITIONER
Payer: COMMERCIAL

## 2024-07-01 DIAGNOSIS — Z00.00 ANNUAL PHYSICAL EXAM: ICD-10-CM

## 2024-07-01 DIAGNOSIS — R21 RASH: ICD-10-CM

## 2024-07-01 LAB
ALBUMIN SERPL-MCNC: 4.9 G/DL (ref 3.2–4.8)
ALBUMIN/GLOB SERPL: 1.6 {RATIO} (ref 1–2)
ALP LIVER SERPL-CCNC: 83 U/L
ALT SERPL-CCNC: 13 U/L
ANION GAP SERPL CALC-SCNC: 9 MMOL/L (ref 0–18)
AST SERPL-CCNC: 13 U/L (ref ?–34)
BILIRUB SERPL-MCNC: 1.1 MG/DL (ref 0.3–1.2)
BUN BLD-MCNC: 9 MG/DL (ref 9–23)
BUN/CREAT SERPL: 12 (ref 10–20)
CALCIUM BLD-MCNC: 9.5 MG/DL (ref 8.7–10.4)
CHLORIDE SERPL-SCNC: 105 MMOL/L (ref 98–112)
CHOLEST SERPL-MCNC: 201 MG/DL (ref ?–200)
CO2 SERPL-SCNC: 25 MMOL/L (ref 21–32)
CREAT BLD-MCNC: 0.75 MG/DL
DEPRECATED RDW RBC AUTO: 41.9 FL (ref 35.1–46.3)
EGFRCR SERPLBLD CKD-EPI 2021: 101 ML/MIN/1.73M2 (ref 60–?)
ERYTHROCYTE [DISTWIDTH] IN BLOOD BY AUTOMATED COUNT: 12.7 % (ref 11–15)
EST. AVERAGE GLUCOSE BLD GHB EST-MCNC: 97 MG/DL (ref 68–126)
FASTING PATIENT LIPID ANSWER: YES
FASTING STATUS PATIENT QL REPORTED: YES
GLOBULIN PLAS-MCNC: 3 G/DL (ref 2–3.5)
GLUCOSE BLD-MCNC: 83 MG/DL (ref 70–99)
HBA1C MFR BLD: 5 % (ref ?–5.7)
HCT VFR BLD AUTO: 45.1 %
HDLC SERPL-MCNC: 25 MG/DL (ref 40–59)
HGB BLD-MCNC: 15.7 G/DL
LDLC SERPL CALC-MCNC: 146 MG/DL (ref ?–100)
MCH RBC QN AUTO: 31.3 PG (ref 26–34)
MCHC RBC AUTO-ENTMCNC: 34.8 G/DL (ref 31–37)
MCV RBC AUTO: 89.8 FL
NONHDLC SERPL-MCNC: 176 MG/DL (ref ?–130)
OSMOLALITY SERPL CALC.SUM OF ELEC: 286 MOSM/KG (ref 275–295)
PLATELET # BLD AUTO: 272 10(3)UL (ref 150–450)
POTASSIUM SERPL-SCNC: 3.7 MMOL/L (ref 3.5–5.1)
PROT SERPL-MCNC: 7.9 G/DL (ref 5.7–8.2)
RBC # BLD AUTO: 5.02 X10(6)UL
SODIUM SERPL-SCNC: 139 MMOL/L (ref 136–145)
TRIGL SERPL-MCNC: 161 MG/DL (ref 30–149)
TSI SER-ACNC: 1.78 MIU/ML (ref 0.55–4.78)
VIT B12 SERPL-MCNC: 250 PG/ML (ref 211–911)
VIT D+METAB SERPL-MCNC: 16.4 NG/ML (ref 30–100)
VLDLC SERPL CALC-MCNC: 30 MG/DL (ref 0–30)
WBC # BLD AUTO: 10.3 X10(3) UL (ref 4–11)

## 2024-07-01 PROCEDURE — 82785 ASSAY OF IGE: CPT

## 2024-07-01 PROCEDURE — 36415 COLL VENOUS BLD VENIPUNCTURE: CPT

## 2024-07-01 PROCEDURE — 80053 COMPREHEN METABOLIC PANEL: CPT

## 2024-07-01 PROCEDURE — 85027 COMPLETE CBC AUTOMATED: CPT

## 2024-07-01 PROCEDURE — 82306 VITAMIN D 25 HYDROXY: CPT

## 2024-07-01 PROCEDURE — 83036 HEMOGLOBIN GLYCOSYLATED A1C: CPT

## 2024-07-01 PROCEDURE — 86003 ALLG SPEC IGE CRUDE XTRC EA: CPT

## 2024-07-01 PROCEDURE — 80061 LIPID PANEL: CPT

## 2024-07-01 PROCEDURE — 82607 VITAMIN B-12: CPT

## 2024-07-01 PROCEDURE — 84443 ASSAY THYROID STIM HORMONE: CPT

## 2024-07-03 RX ORDER — ERGOCALCIFEROL 1.25 MG/1
50000 CAPSULE ORAL WEEKLY
Qty: 12 CAPSULE | Refills: 0 | Status: SHIPPED | OUTPATIENT
Start: 2024-07-03 | End: 2024-09-19

## 2024-07-05 LAB
ALLERGEN BRAZIL NUT: <0.1 KUA/L (ref ?–0.1)
ALMOND IGE QN: <0.1 KUA/L (ref ?–0.1)
CASHEW NUT IGE QN: <0.1 KUA/L (ref ?–0.1)
CLAM IGE QN: <0.1 KUA/L (ref ?–0.1)
CODFISH IGE QN: <0.1 KUA/L (ref ?–0.1)
CORN IGE QN: 0.13 KUA/L (ref ?–0.1)
COW MILK IGE QN: <0.1 KUA/L (ref ?–0.1)
EGG WHITE IGE QN: <0.1 KUA/L (ref ?–0.1)
HAZELNUT IGE QN: <0.1 KUA/L (ref ?–0.1)
IGE SERPL-ACNC: 51.1 KU/L (ref 2–214)
PEANUT IGE QN: <0.1 KUA/L (ref ?–0.1)
SALMON IGE QN: <0.1 KUA/L (ref ?–0.1)
SCALLOP IGE QN: <0.1 KUA/L (ref ?–0.1)
SESAME SEED IGE QN: <0.1 KUA/L (ref ?–0.1)
SHRIMP IGE QN: 0.17 KUA/L (ref ?–0.1)
SOYBEAN IGE QN: <0.1 KUA/L (ref ?–0.1)
WALNUT IGE QN: <0.1 KUA/L (ref ?–0.1)
WHEAT IGE QN: 0.11 KUA/L (ref ?–0.1)

## 2024-07-29 ENCOUNTER — OFFICE VISIT (OUTPATIENT)
Dept: INTERNAL MEDICINE CLINIC | Facility: CLINIC | Age: 44
End: 2024-07-29

## 2024-07-29 VITALS
SYSTOLIC BLOOD PRESSURE: 130 MMHG | WEIGHT: 188 LBS | HEART RATE: 71 BPM | HEIGHT: 64 IN | DIASTOLIC BLOOD PRESSURE: 90 MMHG | BODY MASS INDEX: 32.1 KG/M2

## 2024-07-29 DIAGNOSIS — I10 PRIMARY HYPERTENSION: Primary | ICD-10-CM

## 2024-07-29 DIAGNOSIS — R21 RASH: ICD-10-CM

## 2024-07-29 PROCEDURE — 99214 OFFICE O/P EST MOD 30 MIN: CPT | Performed by: NURSE PRACTITIONER

## 2024-07-29 PROCEDURE — 3075F SYST BP GE 130 - 139MM HG: CPT | Performed by: NURSE PRACTITIONER

## 2024-07-29 PROCEDURE — 3008F BODY MASS INDEX DOCD: CPT | Performed by: NURSE PRACTITIONER

## 2024-07-29 PROCEDURE — 3080F DIAST BP >= 90 MM HG: CPT | Performed by: NURSE PRACTITIONER

## 2024-07-29 NOTE — PROGRESS NOTES
HPI:    Patient ID: Stacie Milian is a 44 year old female.    HPI ED Follow up  44 year old female who I recently seen for HTN abd then she had a syncopal episode and went to the ED on 6/18/2024.  She stated she did not tolerate the amlodipine medication.    Needs Physical    HTN  Patient has not been taking her blood pressure medicine because she never picked up the Losartan 25mg from the pharmacy.    Mammogram- WNL  Immunization History   Administered Date(s) Administered    Covid-19 Vaccine Velia (J&J) 0.5ml 03/26/2021, 02/10/2022    FLUZONE 6 months and older PFS 0.5 ml (94187) 01/15/2024    Influenza 10/14/2008    TDAP 01/15/2014       Past Medical History:    Anxiety state    over the past three months getting \"panic attacks\"     Back problem    cramps in lower back \"due to fibroids\"     Migraines    Uterine leiomyoma      Past Surgical History:   Procedure Laterality Date    Hysterectomy  08/01/2022    MAE BSO    Total abdominal hysterectomy N/A 8/1/2022    Procedure: TOTAL ABDOMINAL HYSTERECTOMY-BILATERAL SALPINGECTOMY;  Surgeon: Ana Burgos MD;  Location: [facility] MAIN OR      Social History     Socioeconomic History    Marital status: Life Partner   Tobacco Use    Smoking status: Never    Smokeless tobacco: Never   Vaping Use    Vaping status: Never Used   Substance and Sexual Activity    Alcohol use: Never    Drug use: Never          Review of Systems   Constitutional:  Negative for chills, fatigue and fever.   HENT:  Negative for congestion, ear discharge, ear pain, facial swelling, hearing loss, postnasal drip, sinus pressure, sore throat and trouble swallowing.    Eyes:  Negative for pain, discharge, redness and visual disturbance.   Respiratory:  Negative for cough, chest tightness, shortness of breath and wheezing.    Cardiovascular:  Negative for chest pain, palpitations and leg swelling.   Gastrointestinal:  Negative for abdominal distention, abdominal pain, constipation, diarrhea, nausea  and vomiting.   Endocrine: Negative for cold intolerance, heat intolerance, polydipsia, polyphagia and polyuria.   Genitourinary:  Negative for difficulty urinating, dysuria, pelvic pain and vaginal bleeding.   Musculoskeletal:  Negative for back pain, gait problem, neck pain and neck stiffness.   Skin:  Negative for color change and rash.   Neurological:  Negative for dizziness, seizures, weakness and headaches.   Psychiatric/Behavioral:  Negative for agitation and sleep disturbance. The patient is not nervous/anxious.               Current Outpatient Medications   Medication Sig Dispense Refill    ergocalciferol 1.25 MG (90137 UT) Oral Cap Take 1 capsule (50,000 Units total) by mouth once a week for 12 doses. 12 capsule 0    losartan 25 MG Oral Tab Take 1 tablet (25 mg total) by mouth daily. 30 tablet 0    ibuprofen 600 MG Oral Tab Take 1 tablet (600 mg total) by mouth every 6 (six) hours. 30 tablet 1    acetaminophen 500 MG Oral Tab Take 2 tablets (1,000 mg total) by mouth every 6 (six) hours as needed. 30 tablet 0    tiZANidine 2 MG Oral Tab 1-2 tabs at bedtime as needed for pain. (Patient not taking: Reported on 2/6/2023) 40 tablet 0     Allergies:  Allergies   Allergen Reactions    Corn UNKNOWN    Shrimp UNKNOWN    Wheat UNKNOWN      PHYSICAL EXAM:   Physical Exam  Constitutional:       Appearance: Normal appearance. She is well-developed.   HENT:      Head: Normocephalic.      Right Ear: Tympanic membrane normal.      Left Ear: Tympanic membrane normal.      Nose: Nose normal.      Mouth/Throat:      Mouth: Mucous membranes are moist.      Pharynx: No oropharyngeal exudate or posterior oropharyngeal erythema.   Eyes:      General:         Right eye: No discharge.         Left eye: No discharge.      Pupils: Pupils are equal, round, and reactive to light.   Cardiovascular:      Rate and Rhythm: Normal rate and regular rhythm.      Heart sounds: Normal heart sounds. No murmur heard.     No friction rub. No  gallop.   Pulmonary:      Effort: Pulmonary effort is normal. No respiratory distress.      Breath sounds: Normal breath sounds. No wheezing, rhonchi or rales.   Abdominal:      General: Bowel sounds are normal. There is no distension.      Palpations: Abdomen is soft. There is no mass.      Tenderness: There is no abdominal tenderness. There is no right CVA tenderness, left CVA tenderness or guarding.   Musculoskeletal:         General: No tenderness.      Cervical back: Normal range of motion and neck supple. No tenderness.      Right lower leg: No edema.      Left lower leg: No edema.   Lymphadenopathy:      Cervical: No cervical adenopathy.   Skin:     General: Skin is warm and dry.      Findings: No rash.   Neurological:      Mental Status: She is alert and oriented to person, place, and time.      Coordination: Coordination normal.      Gait: Gait normal.   Psychiatric:         Mood and Affect: Mood normal.         Behavior: Behavior normal.         Thought Content: Thought content normal.         Judgment: Judgment normal.       /90   Pulse 71   Ht 5' 4\" (1.626 m)   Wt 188 lb (85.3 kg)   LMP 07/15/2022   BMI 32.27 kg/m²   Wt Readings from Last 2 Encounters:   07/29/24 188 lb (85.3 kg)   06/18/24 188 lb (85.3 kg)     Body mass index is 32.27 kg/m².(2)  Lab Results   Component Value Date    WBC 10.3 07/01/2024    RBC 5.02 07/01/2024    HGB 15.7 07/01/2024    HCT 45.1 07/01/2024    MCV 89.8 07/01/2024    MCH 31.3 07/01/2024    MCHC 34.8 07/01/2024    RDW 12.7 07/01/2024    .0 07/01/2024    MPV 7.8 01/21/2014      Lab Results   Component Value Date    GLU 83 07/01/2024    BUN 9 07/01/2024    BUNCREA 12.0 07/01/2024    CREATSERUM 0.75 07/01/2024    ANIONGAP 9 07/01/2024    GFRNAA 93 02/24/2022    GFRAA 107 02/24/2022    CA 9.5 07/01/2024    OSMOCALC 286 07/01/2024    ALKPHO 83 07/01/2024    AST 13 07/01/2024    ALT 13 07/01/2024    BILT 1.1 07/01/2024    TP 7.9 07/01/2024    ALB 4.9 (H)  07/01/2024    GLOBULIN 3.0 07/01/2024     07/01/2024    K 3.7 07/01/2024     07/01/2024    CO2 25.0 07/01/2024      Lab Results   Component Value Date    EAG 97 07/01/2024    A1C 5.0 07/01/2024      Lab Results   Component Value Date    CHOLEST 201 (H) 07/01/2024    TRIG 161 (H) 07/01/2024    HDL 25 (L) 07/01/2024     (H) 07/01/2024    VLDL 30 07/01/2024    NONHDLC 176 (H) 07/01/2024      Lab Results   Component Value Date    TSH 1.776 07/01/2024                ASSESSMENT/PLAN:     Problem List Items Addressed This Visit       Rash     Patient states the rash spread to right nipple- Bilateral breast rash over areola.  She states she could not get an appointment till January.  Patient given referral to Anabella Nina     Plan  She stated she went on vacation and has not made an appointment   Encouraged to make an appointment with a breast specialist         Primary hypertension - Primary     Patient said she did not tolerate the amlodipine.  It made her dizzy.  She held it for the past few days  Blood pressure 130/90, pulse 71, height 5' 4\" (1.626 m), weight 188 lb (85.3 kg), last menstrual period 07/15/2022.      Plan  Discussed weight loss  Losartan 25mg po daily- She did not pick this up last visit and has not been taking any medication.   Losartan 25mg and take this.              Prior to this encounter, I spent over 20 minutes with preparing for the visit, including reviewing documents from other specialties as well as from PCP and going over test results and imaging studies. During the face to face encounter, I spent an additional 15 minutes which were determined for follow-up. Greater than 50% of the time was spent in counseling, anticipatory guidance, and coordination of care. Patient concerns were answered to the best of my knowledge.       No orders of the defined types were placed in this encounter.      Meds This Visit:  Requested Prescriptions      No prescriptions requested or  ordered in this encounter       Imaging & Referrals:  None         Sophia Medrano, APRN

## 2024-07-29 NOTE — ASSESSMENT & PLAN NOTE
Patient said she did not tolerate the amlodipine.  It made her dizzy.  She held it for the past few days  Blood pressure 130/90, pulse 71, height 5' 4\" (1.626 m), weight 188 lb (85.3 kg), last menstrual period 07/15/2022.      Plan  Discussed weight loss  Losartan 25mg po daily- She did not pick this up last visit and has not been taking any medication.   Losartan 25mg and take this.

## 2024-07-29 NOTE — ASSESSMENT & PLAN NOTE
Patient states the rash spread to right nipple- Bilateral breast rash over areola.  She states she could not get an appointment till January.  Patient given referral to Anabella Nina     Plan  She stated she went on vacation and has not made an appointment   Encouraged to make an appointment with a breast specialist   10-Leonard-2019

## 2024-08-06 RX ORDER — LOSARTAN POTASSIUM 25 MG/1
25 TABLET ORAL DAILY
Qty: 90 TABLET | Refills: 3 | Status: SHIPPED | OUTPATIENT
Start: 2024-08-06

## 2024-08-06 NOTE — TELEPHONE ENCOUNTER
Please review. Protocol Failed; No Protocol    Original rx written 30 with no refills.  Rx is pended, is refill appropriate?     Requested Prescriptions   Pending Prescriptions Disp Refills    losartan 25 MG Oral Tab 30 tablet 0     Sig: Take 1 tablet (25 mg total) by mouth daily.       Hypertension Medications Protocol Failed - 8/1/2024 11:23 AM        Failed - Last BP reading less than 140/90     BP Readings from Last 1 Encounters:   07/29/24 130/90               Passed - CMP or BMP in past 12 months        Passed - In person appointment or virtual visit in the past 12 mos or appointment in next 3 mos     Recent Outpatient Visits              [facility]    [facility]    1 month ago [facility]    [facility] Visit    2 months ago Primary hypertension    [facility]    Office Visit    3 months ago Rash    [facility]    [facility] Visit    3 months ago Abnormality of breast on screening mammography    [facility] Soph[facility] Med[facility] APR[facility]    Office Visit          Future Appointments         Provider Department Appt Notes    In 2 months Sophia Medrano APRN [facility]    In 5 months Darlene Ovalle MD [facility], [facility] Sophia Medrano referring, forms emailed  Breast rash resolved, wants to keep this visit                    Passed - EGFRCR or GFRNAA > 50     GFR Evaluation  EGFRCR: 101 , resulted on 7/1/2024                 Future Appointments         Provider Department Appt Notes    In 2 months Sophia Medrano APRN [facility]    In 5 months  Darlene Ovalle MD [facility], [facility], Ethan Medrano referring, forms emailed  Breast rash resolved, wants to keep this visit          Recent Outpatient Visits              1 week ago Primary hypertension    [facility], [facility], Sophia Lockett, [facility]    Office Visit    1 month ago Annual physical exam    [facility], [facility], Sophia Lockett APRN    Office Visit    2 months ago Primary hypertension    [facility], [facility], Sophia Lockett APRN    Office Visit    3 months ago Rash    [facility], [facility], Sophia Lockett APRN    Office Visit    3 months ago Abnormality of breast on screening mammography    [facility], [facility], Sophia Lockett APRN    Office Visit

## 2024-08-29 RX ORDER — ERGOCALCIFEROL 1.25 MG/1
50000 CAPSULE, LIQUID FILLED ORAL WEEKLY
Qty: 12 CAPSULE | Refills: 0 | OUTPATIENT
Start: 2024-08-29

## 2024-08-29 NOTE — TELEPHONE ENCOUNTER
1.  Does the patient have a moderate to severe fever?  No  2.  Has the patient had a serious reaction to a flu shot before?   No  3.  Has the patient ever had Guillian [place] Syndrome with 6 weeks of a previous flu shot?  No  4.  Does the patient have a serious allergy to eggs?  No  5.  If person is answering for a child, is the child less that 6 months of age? No    A \"YES\" answer to any question above means the patient is not eligible to receive the vaccine.  After obtaining informed consent, the PNEUMOVAX  immunization is given by NATALIIA Zamarripa.         Please review. Protocol Failed; No Protocol        Component  Ref Range & Units 7/1/24  7:00 AM   Vitamin D, 25OH, Total  30.0 - 100.0 ng/mL 16.4 Low           Requested Prescriptions   Pending Prescriptions Disp Refills    ERGOCALCIFEROL 1.25 MG (82869 UT) Oral Cap [Pharmacy Med Name: VITAMIN D2 50,000IU (ERGO) CAP RX] 12 capsule 0     Sig: TAKE 1 CAPSULE BY MOUTH 1 TIME A WEEK FOR 12 DOSES       There is no refill protocol information for this order            Future Appointments         Provider Department Appt Notes    In 1 month Sophia Medrano APRN [facility]    In 4 months Darlene Ovalle MD [facility], [facility] Sophia Medrano referring, forms emailed  Breast rash resolved, wants to keep this visit          Recent Outpatient Visits              [facility]    [facility]    2 months ago [facility]    Office Visit    3 months ago [facility]    Office Visit    4 months ago [facility]    Office Visit    4 months ago Abnormality of breast on screening mammography    [facility]    Office Visit

## 2024-09-21 ENCOUNTER — NURSE TRIAGE (OUTPATIENT)
Dept: INTERNAL MEDICINE CLINIC | Facility: CLINIC | Age: 44
End: 2024-09-21

## 2024-09-21 NOTE — TELEPHONE ENCOUNTER
Action Requested: Summary for Provider     []  Critical Lab, Recommendations Needed  [] Need Additional Advice  [x]   FYI    []   Need Orders  [] Need Medications Sent to Pharmacy  []  Other     SUMMARY: Patient scheduled for an appointment 24 with [facility] Medrano    Patient states she has been taking Losartan and it was working well, When she picked up refill 24, was different color. She has been taking it since 24,  1 week ago had an anxiety attack, felt he heart beating fast. Since then no appetite, at times feels like she is floating.Her blood pressure yesterday was 170/109 at end of the day     Currently patient is at work, no dizziness, chest pain, shortness of breath,headache or rapid heart rate. Patient took the losartan today.    Patient verbalized understanding if any chest pain or shortness of breath, irregular heart rate to go to ER.       Reason for call: Medication Question and Blood Pressure  Onset: 1 week     Reason for Disposition   Taking BP medications and feels is having side effects (e.g., impotence, cough, dizziness)    Protocols used: Blood Pressure - High-A-OH              Name:SATURDAY TRIAGE RN                      2024 9:54:23 AM  ProfileId:    [id number]                   [facility]  Department:[place] ANSWERING SERVICE  ======================================================================  Text Messages    Message # [phone]         2024 09:51a   [GINAR]  To:  SATURDAY TRIAGE RN  From:  PILI WATERS 80  Primary MD:  SARAH  Phone#:  [phone]  ----------------------------------------------------------------------  NEEDS TO DISCUSS ISSUE WITH HER BP. WAS TAKING MEDICATION FOR THREE WEEKS. WHEN SHE GOT A REFILL, THE COLOR OF THE MEDICATION CHANGED, IT WAS FROM A DIFFERENT . THEN SHE HAD BAD SIDE EFFECTS. SHE IS GETTING STRANGE EFFECTS FROM IT, FEELING LIKE SHE IS FLOATING, NO APPETITE, FEELS WEAK. BP IS HIGH. PLEASE  ADVISE.          (Message Is Not Delivered)  ======================================================================

## 2024-09-23 ENCOUNTER — OFFICE VISIT (OUTPATIENT)
Dept: INTERNAL MEDICINE CLINIC | Facility: CLINIC | Age: 44
End: 2024-09-23

## 2024-09-23 ENCOUNTER — LAB ENCOUNTER (OUTPATIENT)
Dept: LAB | Age: 44
End: 2024-09-23
Attending: NURSE PRACTITIONER
Payer: COMMERCIAL

## 2024-09-23 VITALS
HEART RATE: 86 BPM | DIASTOLIC BLOOD PRESSURE: 80 MMHG | SYSTOLIC BLOOD PRESSURE: 120 MMHG | HEIGHT: 64 IN | WEIGHT: 177 LBS | BODY MASS INDEX: 30.22 KG/M2

## 2024-09-23 DIAGNOSIS — I10 PRIMARY HYPERTENSION: ICD-10-CM

## 2024-09-23 DIAGNOSIS — R39.89 BLADDER PAIN: ICD-10-CM

## 2024-09-23 DIAGNOSIS — F41.9 ANXIETY: ICD-10-CM

## 2024-09-23 DIAGNOSIS — R07.89 CHEST DISCOMFORT: ICD-10-CM

## 2024-09-23 DIAGNOSIS — R10.12 LUQ PAIN: ICD-10-CM

## 2024-09-23 DIAGNOSIS — R07.89 CHEST DISCOMFORT: Primary | ICD-10-CM

## 2024-09-23 LAB
ALBUMIN SERPL-MCNC: 5 G/DL (ref 3.2–4.8)
ALBUMIN/GLOB SERPL: 1.5 {RATIO} (ref 1–2)
ALP LIVER SERPL-CCNC: 82 U/L
ALT SERPL-CCNC: 14 U/L
AMYLASE SERPL-CCNC: 76 U/L (ref 30–118)
ANION GAP SERPL CALC-SCNC: 6 MMOL/L (ref 0–18)
AST SERPL-CCNC: 17 U/L (ref ?–34)
BASOPHILS # BLD AUTO: 0.03 X10(3) UL (ref 0–0.2)
BASOPHILS NFR BLD AUTO: 0.3 %
BILIRUB SERPL-MCNC: 0.8 MG/DL (ref 0.3–1.2)
BILIRUB UR QL: NEGATIVE
BUN BLD-MCNC: 7 MG/DL (ref 9–23)
BUN/CREAT SERPL: 9 (ref 10–20)
CALCIUM BLD-MCNC: 9.4 MG/DL (ref 8.7–10.4)
CHLORIDE SERPL-SCNC: 106 MMOL/L (ref 98–112)
CLARITY UR: CLEAR
CO2 SERPL-SCNC: 25 MMOL/L (ref 21–32)
COLOR UR: COLORLESS
CREAT BLD-MCNC: 0.78 MG/DL
DEPRECATED RDW RBC AUTO: 40.3 FL (ref 35.1–46.3)
EGFRCR SERPLBLD CKD-EPI 2021: 96 ML/MIN/1.73M2 (ref 60–?)
EOSINOPHIL # BLD AUTO: 0.07 X10(3) UL (ref 0–0.7)
EOSINOPHIL NFR BLD AUTO: 0.7 %
ERYTHROCYTE [DISTWIDTH] IN BLOOD BY AUTOMATED COUNT: 12.4 % (ref 11–15)
FASTING STATUS PATIENT QL REPORTED: NO
GLOBULIN PLAS-MCNC: 3.3 G/DL (ref 2–3.5)
GLUCOSE BLD-MCNC: 96 MG/DL (ref 70–99)
GLUCOSE UR-MCNC: NORMAL MG/DL
HCT VFR BLD AUTO: 45.8 %
HGB BLD-MCNC: 15.5 G/DL
HGB UR QL STRIP.AUTO: NEGATIVE
IMM GRANULOCYTES # BLD AUTO: 0.03 X10(3) UL (ref 0–1)
IMM GRANULOCYTES NFR BLD: 0.3 %
KETONES UR-MCNC: NEGATIVE MG/DL
LEUKOCYTE ESTERASE UR QL STRIP.AUTO: NEGATIVE
LIPASE SERPL-CCNC: 35 U/L (ref 12–53)
LYMPHOCYTES # BLD AUTO: 2.25 X10(3) UL (ref 1–4)
LYMPHOCYTES NFR BLD AUTO: 22.2 %
MCH RBC QN AUTO: 30 PG (ref 26–34)
MCHC RBC AUTO-ENTMCNC: 33.8 G/DL (ref 31–37)
MCV RBC AUTO: 88.6 FL
MONOCYTES # BLD AUTO: 0.67 X10(3) UL (ref 0.1–1)
MONOCYTES NFR BLD AUTO: 6.6 %
NEUTROPHILS # BLD AUTO: 7.1 X10 (3) UL (ref 1.5–7.7)
NEUTROPHILS # BLD AUTO: 7.1 X10(3) UL (ref 1.5–7.7)
NEUTROPHILS NFR BLD AUTO: 69.9 %
NITRITE UR QL STRIP.AUTO: NEGATIVE
OSMOLALITY SERPL CALC.SUM OF ELEC: 282 MOSM/KG (ref 275–295)
PH UR: 6 [PH] (ref 5–8)
PLATELET # BLD AUTO: 284 10(3)UL (ref 150–450)
POTASSIUM SERPL-SCNC: 3.5 MMOL/L (ref 3.5–5.1)
PROT SERPL-MCNC: 8.3 G/DL (ref 5.7–8.2)
PROT UR-MCNC: NEGATIVE MG/DL
RBC # BLD AUTO: 5.17 X10(6)UL
SODIUM SERPL-SCNC: 137 MMOL/L (ref 136–145)
SP GR UR STRIP: <1.005 (ref 1–1.03)
UROBILINOGEN UR STRIP-ACNC: NORMAL
WBC # BLD AUTO: 10.2 X10(3) UL (ref 4–11)

## 2024-09-23 PROCEDURE — 85025 COMPLETE CBC W/AUTO DIFF WBC: CPT

## 2024-09-23 PROCEDURE — 99214 OFFICE O/P EST MOD 30 MIN: CPT | Performed by: NURSE PRACTITIONER

## 2024-09-23 PROCEDURE — 83690 ASSAY OF LIPASE: CPT

## 2024-09-23 PROCEDURE — 3008F BODY MASS INDEX DOCD: CPT | Performed by: NURSE PRACTITIONER

## 2024-09-23 PROCEDURE — 80053 COMPREHEN METABOLIC PANEL: CPT

## 2024-09-23 PROCEDURE — 36415 COLL VENOUS BLD VENIPUNCTURE: CPT

## 2024-09-23 PROCEDURE — 82150 ASSAY OF AMYLASE: CPT

## 2024-09-23 PROCEDURE — 81003 URINALYSIS AUTO W/O SCOPE: CPT

## 2024-09-23 PROCEDURE — 3074F SYST BP LT 130 MM HG: CPT | Performed by: NURSE PRACTITIONER

## 2024-09-23 PROCEDURE — 3079F DIAST BP 80-89 MM HG: CPT | Performed by: NURSE PRACTITIONER

## 2024-09-23 RX ORDER — ALPRAZOLAM 0.25 MG
0.25 TABLET ORAL 2 TIMES DAILY PRN
Qty: 30 TABLET | Refills: 0 | Status: SHIPPED | OUTPATIENT
Start: 2024-09-23

## 2024-09-23 NOTE — PROGRESS NOTES
HPI:    Patient ID: Stacie Milian is a 44 year old female.    HPI Anxiety   44-year-old female who I recently seen for discoloration of both breasts with dark in nipples.  She has an appointment with the breast specialist..  She is here with her partner and child.    Reviewed Triage Note  Patient states she has been taking Losartan and it was working well, When she picked up refill 8/6/24, was different color. She has been taking it since 8/6/24,  1 week ago had an anxiety attack, felt he heart beating fast. Since then no appetite, at times feels like she is floating.Her blood pressure yesterday was 170/109 at end of the day      Currently patient is at work, no dizziness, chest pain, shortness of breath,headache or rapid heart rate. Patient took the losartan today.     Patient verbalized understanding if any chest pain or shortness of breath, irregular heart rate to go to ER.        They both expressed that she has been extremely anxious.  She was driving and feels her heart pounding.  ROS  +Fatigue  +Chest Tightness  +SOB  +Nausea  +trouble swallowing  +Voice Change  Very anxious    Wt Readings from Last 6 Encounters:   09/23/24 177 lb (80.3 kg)   07/29/24 188 lb (85.3 kg)   06/18/24 188 lb (85.3 kg)   06/17/24 189 lb (85.7 kg)   05/09/24 189 lb (85.7 kg)   04/29/24 189 lb (85.7 kg)          Immunization History   Administered Date(s) Administered    Covid-19 Vaccine Uni-Pixel (J&J) 0.5ml 03/26/2021, 02/10/2022    FLUZONE 6 months and older PFS 0.5 ml (85751) 01/15/2024    Influenza 10/14/2008    TDAP 01/15/2014       Past Medical History:    Anxiety state    over the past three months getting \"panic attacks\"     Back problem    cramps in lower back \"due to fibroids\"     Migraines    Uterine leiomyoma      Past Surgical History:   Procedure Laterality Date    Hysterectomy  08/01/2022    [facility]    Total abdominal hysterectomy N/A 8/1/2022    Procedure: TOTAL ABDOMINAL HYSTERECTOMY-BILATERAL  SALPINGECTOMY;  Surgeon: Ana Burgos MD;  Location: [facility] MAIN OR      Social History     Socioeconomic History    Marital status: Life Partner   Tobacco Use    Smoking status: Never    Smokeless tobacco: Never   Vaping Use    Vaping status: Never Used   Substance and Sexual Activity    Alcohol use: Never    Drug use: Never          Review of Systems   Constitutional:  Positive for fatigue. Negative for chills and fever.   HENT:  Positive for trouble swallowing and voice change. Negative for congestion, ear discharge, ear pain, facial swelling, hearing loss, postnasal drip, sinus pressure and sore throat.    Eyes:  Negative for pain, discharge, redness and visual disturbance.   Respiratory:  Positive for chest tightness and shortness of breath. Negative for cough and wheezing.    Cardiovascular:  Negative for chest pain, palpitations and leg swelling.   Gastrointestinal:  Positive for abdominal pain, nausea and vomiting. Negative for abdominal distention, constipation and diarrhea.   Endocrine: Negative for cold intolerance, heat intolerance, polydipsia, polyphagia and polyuria.   Genitourinary:  Negative for difficulty urinating, dysuria, pelvic pain and vaginal bleeding.   Musculoskeletal:  Negative for back pain, gait problem, neck pain and neck stiffness.   Skin:  Positive for rash. Negative for color change.   Neurological:  Positive for headaches. Negative for dizziness, seizures and weakness.   Psychiatric/Behavioral:  Positive for sleep disturbance. Negative for agitation. The patient is nervous/anxious.               Current Outpatient Medications   Medication Sig Dispense Refill    ALPRAZolam (XANAX) 0.25 MG Oral Tab Take 1 tablet (0.25 mg total) by mouth 2 (two) times daily as needed. 30 tablet 0    losartan 25 MG Oral Tab Take 1 tablet (25 mg total) by mouth daily. 90 tablet 3    ibuprofen 600 MG Oral Tab Take 1 tablet (600 mg total) by mouth every 6 (six) hours. 30 tablet 1    acetaminophen 500 MG Oral  Tab Take 2 tablets (1,000 mg total) by mouth every 6 (six) hours as needed. 30 tablet 0     Allergies:  Allergies   Allergen Reactions    Corn UNKNOWN    Shrimp UNKNOWN    Wheat UNKNOWN      PHYSICAL EXAM:   Physical Exam  Constitutional:       General: She is not in acute distress.     Appearance: Normal appearance. She is well-developed. She is not ill-appearing, toxic-appearing or diaphoretic.   HENT:      Head: Normocephalic.      Right Ear: Tympanic membrane normal.      Left Ear: Tympanic membrane normal.      Nose: Nose normal.      Mouth/Throat:      Mouth: Mucous membranes are moist.      Pharynx: No oropharyngeal exudate or posterior oropharyngeal erythema.   Eyes:      General:         Right eye: No discharge.         Left eye: No discharge.      Pupils: Pupils are equal, round, and reactive to light.   Cardiovascular:      Rate and Rhythm: Normal rate and regular rhythm.      Heart sounds: Normal heart sounds. No murmur heard.     No friction rub. No gallop.   Pulmonary:      Effort: Pulmonary effort is normal. No respiratory distress.      Breath sounds: Normal breath sounds. No wheezing, rhonchi or rales.   Abdominal:      General: Bowel sounds are normal. There is no distension.      Palpations: Abdomen is soft. There is no mass.      Tenderness: There is no abdominal tenderness (LUQ pain). There is no right CVA tenderness, left CVA tenderness or guarding.      Comments: LUQ pain on palpation  Tenderness over bladder   Musculoskeletal:         General: No tenderness.      Cervical back: Normal range of motion and neck supple. No tenderness.      Right lower leg: No edema.      Left lower leg: No edema.   Lymphadenopathy:      Cervical: No cervical adenopathy.   Skin:     General: Skin is warm and dry.      Findings: No rash.   Neurological:      Mental Status: She is alert and oriented to person, place, and time.      Coordination: Coordination normal.      Gait: Gait normal.   Psychiatric:          Mood and Affect: Mood normal.         Behavior: Behavior normal.         Thought Content: Thought content normal.         Judgment: Judgment normal.       /80 (BP Location: Right arm, Patient Position: Sitting, Cuff Size: adult)   Pulse 86   Ht 5' 4\" (1.626 m)   Wt 177 lb (80.3 kg)   LMP 07/15/2022   BMI 30.38 kg/m²   Wt Readings from Last 2 Encounters:   09/23/24 177 lb (80.3 kg)   07/29/24 188 lb (85.3 kg)     Body mass index is 30.38 kg/m².(2)  Lab Results   Component Value Date    WBC 10.3 07/01/2024    RBC 5.02 07/01/2024    HGB 15.7 07/01/2024    HCT 45.1 07/01/2024    MCV 89.8 07/01/2024    MCH 31.3 07/01/2024    MCHC 34.8 07/01/2024    RDW 12.7 07/01/2024    .0 07/01/2024    MPV 7.8 01/21/2014      Lab Results   Component Value Date    GLU 83 07/01/2024    BUN 9 07/01/2024    BUNCREA 12.0 07/01/2024    CREATSERUM 0.75 07/01/2024    ANIONGAP 9 07/01/2024    GFRNAA 93 02/24/2022    GFRAA 107 02/24/2022    CA 9.5 07/01/2024    OSMOCALC 286 07/01/2024    ALKPHO 83 07/01/2024    AST 13 07/01/2024    ALT 13 07/01/2024    BILT 1.1 07/01/2024    TP 7.9 07/01/2024    ALB 4.9 (H) 07/01/2024    GLOBULIN 3.0 07/01/2024     07/01/2024    K 3.7 07/01/2024     07/01/2024    CO2 25.0 07/01/2024      Lab Results   Component Value Date    EAG 97 07/01/2024    A1C 5.0 07/01/2024      Lab Results   Component Value Date    CHOLEST 201 (H) 07/01/2024    TRIG 161 (H) 07/01/2024    HDL 25 (L) 07/01/2024     (H) 07/01/2024    VLDL 30 07/01/2024    NONHDLC 176 (H) 07/01/2024      Lab Results   Component Value Date    TSH 1.776 07/01/2024                ASSESSMENT/PLAN:     Problem List Items Addressed This Visit       Anxiety     Chest Discomfort with anxiety-  LUQ pain    Plan  CBC, CMP  Amylase  Lipase    ALPRAZolam (XANAX) 0.25 MG Oral Tab          Take 1 tablet (0.25 mg total) by mouth 2 (two) times daily as needed., Normal, Disp-30 tablet, R-0                Relevant Medications    ALPRAZolam  (XANAX) 0.25 MG Oral Tab    Primary hypertension     Blood pressure 120/80, pulse 86, height 5' 4\" (1.626 m), weight 177 lb (80.3 kg), last menstrual period 07/15/2022.     Stable on Losartan 25mg po daily          Other Visit Diagnoses       Chest discomfort    -  Primary    Relevant Orders    CARD ECHO 2D DOPPLER (CPT=93306)    CT CALCIUM SCORING    CBC W Differential W Platelet [E]    Comp Metabolic Panel (14)    Amylase [E]    Lipase [E]    LUQ pain        Relevant Orders    Amylase [E]    Lipase [E]    Bladder pain        Relevant Orders    Urinalysis with Culture Reflex               Orders Placed This Encounter   Procedures    CBC W Differential W Platelet [E]    Comp Metabolic Panel (14)    Amylase [E]    Lipase [E]    Urinalysis with Culture Reflex       Meds This Visit:  Requested Prescriptions     Signed Prescriptions Disp Refills    ALPRAZolam (XANAX) 0.25 MG Oral Tab 30 tablet 0     Sig: Take 1 tablet (0.25 mg total) by mouth 2 (two) times daily as needed.       Imaging & Referrals:  CARD ECHO 2D DOPPLER (CPT=93306)  CT CALCIUM SCORING         CIRO Robb

## 2024-09-23 NOTE — ASSESSMENT & PLAN NOTE
Chest Discomfort with anxiety-  LUQ pain    Plan  CBC, CMP  Amylase  Lipase    ALPRAZolam (XANAX) 0.25 MG Oral Tab          Take 1 tablet (0.25 mg total) by mouth 2 (two) times daily as needed., Normal, Disp-30 tablet, R-0

## 2024-09-23 NOTE — ASSESSMENT & PLAN NOTE
Blood pressure 120/80, pulse 86, height 5' 4\" (1.626 m), weight 177 lb (80.3 kg), last menstrual period 07/15/2022.     Stable on Losartan 25mg po daily

## 2024-09-30 ENCOUNTER — HOSPITAL ENCOUNTER (OUTPATIENT)
Dept: CV DIAGNOSTICS | Facility: HOSPITAL | Age: 44
Discharge: HOME | End: 2024-09-30
Attending: NURSE PRACTITIONER
Payer: COMMERCIAL

## 2024-09-30 DIAGNOSIS — R07.89 CHEST DISCOMFORT: ICD-10-CM

## 2024-09-30 PROCEDURE — 93306 TTE W/DOPPLER COMPLETE: CPT | Performed by: NURSE PRACTITIONER

## 2024-10-07 ENCOUNTER — OFFICE VISIT (OUTPATIENT)
Dept: SURGERY | Facility: CLINIC | Age: 44
End: 2024-10-07
Payer: COMMERCIAL

## 2024-10-07 VITALS
BODY MASS INDEX: 29.77 KG/M2 | TEMPERATURE: 98 F | WEIGHT: 174.38 LBS | HEIGHT: 64 IN | HEART RATE: 73 BPM | OXYGEN SATURATION: 100 % | RESPIRATION RATE: 18 BRPM | SYSTOLIC BLOOD PRESSURE: 137 MMHG | DIASTOLIC BLOOD PRESSURE: 89 MMHG

## 2024-10-07 DIAGNOSIS — Z80.3 FAMILY HISTORY OF BREAST CANCER: ICD-10-CM

## 2024-10-07 DIAGNOSIS — R23.4 BREAST SKIN CHANGES: ICD-10-CM

## 2024-10-07 DIAGNOSIS — Z91.89 AT HIGH RISK FOR BREAST CANCER: Primary | ICD-10-CM

## 2024-10-07 PROCEDURE — 3079F DIAST BP 80-89 MM HG: CPT

## 2024-10-07 PROCEDURE — 3075F SYST BP GE 130 - 139MM HG: CPT

## 2024-10-07 PROCEDURE — 3008F BODY MASS INDEX DOCD: CPT

## 2024-10-07 PROCEDURE — 99203 OFFICE O/P NEW LOW 30 MIN: CPT

## 2024-10-07 RX ORDER — TRIAMCINOLONE ACETONIDE 1 MG/G
CREAM TOPICAL 2 TIMES DAILY
Qty: 45 G | Refills: 0 | Status: SHIPPED | OUTPATIENT
Start: 2024-10-07

## 2024-10-09 NOTE — PROGRESS NOTES
[facility]  History of Present Illness:  This is the first visit for this 44 year old woman, referred by Dr. Farrar, who presents for breast cancer risk evaluation related to her family history, which is detailed below. Patient also describes rashes to her bilateral breasts which comes and goes.   She denies specific breast lumps, nipple discharge, or other problems.  She has not had a prior history of breast disease or breast biopsy.  Most recent imaging was a bilateral diagnostic mammogram on 2024 which showed stable findings.  The patient has tried moisturizing her bilateral breasts to help with the rash.  Past Medical History:  Past Medical History:    Anxiety state    over the past three months getting \"panic attacks\"     Back problem    cramps in lower back \"due to fibroids\"     Migraines    Uterine leiomyoma       Past Surgical History:    Past Surgical History:   Procedure Laterality Date    Hysterectomy  2022    [facility]    Total abdominal hysterectomy N/A 2022    Procedure: TOTAL ABDOMINAL HYSTERECTOMY-BILATERAL SALPINGECTOMY;  Surgeon: Ana Burgos MD;  Location: [facility] MAIN OR       Gynecological History:  Pt is a   Pt was 28 years old at time of first pregnancy.    She has cumulative breastfeeding history of 1 months.  She achieved menarche at age 15 and LMP age 42  Age of Menopause: 42  Type: hysterectomy with ovaries left in  She denies any history of hormone replacement therapy   She denies any history of oral contraceptive use   She denies infertility treatment to achieve pregnancy.    Medications:     triamcinolone 0.1 % External Cream Apply topically 2 (two) times daily. 45 g 0    ALPRAZolam (XANAX) 0.25 MG Oral Tab Take 1 tablet (0.25 mg total) by mouth 2 (two) times daily as needed. 30 tablet 0    ibuprofen 600 MG Oral Tab Take 1 tablet (600 mg total) by mouth every 6 (six) hours. 30 tablet 1    acetaminophen 500 MG Oral Tab Take 2  tablets (1,000 mg total) by mouth every 6 (six) hours as needed. 30 tablet 0       Allergies:    Allergies[1]    Family History:  Family History   Problem Relation Age of Onset    Heart Disorder Father     Cancer Sister     Breast Cancer Sister 29     She is not of Ashkenazi Mosque ancestry.    Social History:  History   Alcohol Use Never       History   Smoking Status    Never   Smokeless Tobacco    Never     Ms. Stacie Milian is Single with 2 children. She has 2 siblings. She is currently Employed full-time    Review of Systems:  General: The patient denies, fever, chills, night sweats, fatigue, generalized weakness, change in appetite or weight loss.    HEENT:     The patient denies eye irritation, cataracts, redness, glaucoma, yellowing of the eyes, change in vision, color blindness, or wears contacts/glasses. The patient denies hearing loss, ringing in the ears, ear drainage, earaches, nasal congestion, nose bleeds, snoring, pain in mouth/throat, hoarseness, change in voice, facial trauma.    Respiratory:  The patient denies chronic cough, phlegm, hemoptysis, pleurisy/chest pain, pneumonia, asthma, wheezing, difficulty in breathing with exertion, emphysema, chronic bronchitis, shortness of breath or abnormal sound when breathing.     Cardiovascular:  There is no history of chest pain, chest pressure/discomfort, palpitations, irregular heartbeat, fainting or near-fainting, difficulty breathing when lying flat, SOB/Coughing at night, swelling of the legs or chest pain while walking.    Breasts:  See history of present illness    Gastrointestinal:     There is no history of difficulty or pain with swallowing, reflux symptoms, vomiting, dark or bloody stools, constipation, yellowing of the skin, indigestion, nausea, change in bowel habits, diarrhea, abdominal pain or vomiting blood.     Genitourinary:  The patient denies frequent urination, needing to get up at night to urinate, urinary hesitancy or  retaining urine, painful urination, urinary incontinence, decreased urine stream, blood in the urine or vaginal/penile discharge.    Skin:    The patient denies rash, itching, skin lesions, dry skin, change in skin color or change in moles.     Hematologic/Lymphatic:  The patient denies easily bruising or bleeding or persistent swollen glands or lymph nodes.     Musculoskeletal:  The patient denies muscle aches/pain, joint pain, stiff joints, neck pain, back pain or bone pain.    Neuropsychiatric:  There is no history of migraines or severe headaches, seizure/epilepsy, speech problems, coordination problems, trembling/tremors, fainting/black outs, dizziness, memory problems, loss of sensation/numbness, problems walking, weakness, tingling or burning in hands/feet. There is no history of abusive relationship, bipolar disorder, sleep disturbance, anxiety, depression or feeling of despair.    Endocrine:    There is no history of poor/slow wound healing, weight loss/gain, fertility or hormone problems, cold intolerance, thyroid disease.     Allergic/Immunologic:  There is no history of hives, hay fever, angioedema or anaphylaxis.    /89 (BP Location: Left arm, Patient Position: Sitting, Cuff Size: adult)   Pulse 73   Temp 97.8 °F (36.6 °C) (Tympanic)   Resp 18   Ht 1.626 m (5' 4\")   Wt 79.1 kg (174 lb 6.4 oz)   LMP 07/15/2022   SpO2 100%   BMI 29.94 kg/m²     Physical Exam:  The patient is an alert, oriented, well-nourished and  well-developed woman who appears her stated age. Her speech patterns and movements are normal. Her affect is appropriate.    HEENT: The head is normocephalic. The neck is supple. The thyroid is not enlarged and is without palpable masses/nodules. There are no palpable masses. The trachea is in the midline. Conjunctiva are clear, non-icteric.    Chest: The chest expands symmetrically. The lungs are clear to auscultation.    Heart: The rhythm is regular.  There are no murmurs, rubs,  gallops or thrills.    Breasts:  Her breasts are symmetrical with a cup size 36C.  Right breast:: There is a 2 cm area of excoriation to the areola.  There is no skin dimpling with movement of the pectoralis. There is no nipple retraction. No nipple discharge can be elicited. The parenchyma is mildly nodular. There are no dominant masses in the breast. The axillary tail is normal.  Left breast:   There is a 2 cm area of scar reaction to the areola.  There is no skin dimpling with movement of the pectoralis. There is no nipple retraction. No nipple discharge can be elicited. The parenchyma is mildly nodular. There are no dominant masses in the breast. The axillary tail is normal.    Abdomen:  The abdomen is soft, flat and non tender. The liver is not enlarged. There are no palpable masses.    Lymph Nodes:  The supraclavicular, axillary and cervical regions are free of significant lymphadenopathy.    Back: There is no vertebral column tenderness.    Skin: The skin appears normal. There are no suspicious appearing rashes or lesions.    Extremities: The extremities are without deformity, cyanosis or edema.    Imaging:  The patient’s mammography report from April 2024 was reviewed and discussed with her.    Assessment:  44 year old year old female with an increased risk for breast cancer based on multiple risk assessment models as well as bilateral areola skin changes.    Discussion and Plan:  I had a discussion with the patient regarding her breast exam.  On exam today from what appears to be a primary dermatological issue of the areola on both breasts.  I suspect this may be eczema.  I have sent her a prescription for a steroid cream to apply to both of her areolas.  We discussed a punch biopsy may be warranted if this does not resolve with the topical steroid.     I explained her breast cancer risk estimates to her and answered questions she had pertaining to her level of risk.  The patient's current five-year risk  for breast cancer is elevated when compared to her peer group. We discussed factors that would further increase her risk for breast cancer over time to include age, future breast biopsy, as well as additional family members that may be diagnosed with breast cancer.     We then turned our discussion towards genetic counseling. We discussed the implications of carrying a genetic mutation as well as both surveillance programs and surgical risk-reducing surgery.  The patient was informed that the most appropriate person to initiate testing would be her sister.  She was given information regarding our cancer genetic program.      We then talked about surveillance and I recommended semiannual breast exams as well as continuing with annual mammography.  At this point, I recommend annual breast MRI, as Stacie Milian does have a lifetime risk of breast cancer >20% as per ACS recommendations.  We would recommend annual MRIs to be staggered six months in relation with her annual mammogram.       With regard to risk-reducing interventions, we discussed lifestyle modifications including attention to diet, exercise, weight control, moderation in alcohol use, and avoidance of long-term hormone replacement therapy in the future.  We also discussed the benefit of a cumulative time of eighteen months or more of breastfeeding.    The patient was given ample opportunity for questions, and those questions were answered to her satisfaction.  I will be in touch with the patient in 1 week to see if there is any improvement to her bilateral areolas.  I do recommend breast MRIs as patient is at high risk for developing breast cancer due to her family history.  She has been encouraged to contact the office with any questions/concerns prior to her next appointment.    I discussed Ms. Stacie Milian's breast cancer risk evaluation with her over a 30 minute encounter, more than 50% of which was devoted to the discussion of  management options.        [1]   Allergies  Allergen Reactions    Corn UNKNOWN    Shrimp UNKNOWN    Wheat UNKNOWN

## 2024-10-14 ENCOUNTER — OFFICE VISIT (OUTPATIENT)
Dept: INTERNAL MEDICINE CLINIC | Facility: CLINIC | Age: 44
End: 2024-10-14

## 2024-10-14 VITALS
BODY MASS INDEX: 29.84 KG/M2 | SYSTOLIC BLOOD PRESSURE: 128 MMHG | HEIGHT: 64 IN | DIASTOLIC BLOOD PRESSURE: 85 MMHG | WEIGHT: 174.81 LBS | OXYGEN SATURATION: 100 % | HEART RATE: 68 BPM

## 2024-10-14 DIAGNOSIS — Z00.00 ANNUAL PHYSICAL EXAM: Primary | ICD-10-CM

## 2024-10-14 NOTE — PROGRESS NOTES
HPI:    Patient ID: Stacie Milian is a 44 year old female.    HPI Physical Exam  44 year old female is here for annual physical exam   She is here to discuss health maintenance issues.     She had relationship issues  She has been going for counseling and her  continues to go to counseling    Breast Rash  The Triamcinolone cream is helping her bilateral nipple rash.    Anxiety - improved.  Immunization History   Administered Date(s) Administered    Covid-19 Vaccine ContaAzul (J&J) 0.5ml 03/26/2021, 02/10/2022    FLUZONE 6 months and older PFS 0.5 ml (96020) 01/15/2024    Influenza 10/14/2008    Influenza Vaccine, trivalent (IIV3), PF 0.5mL (75963) 10/14/2024    TDAP 01/15/2014       Past Medical History:    Anxiety state    over the past three months getting \"panic attacks\"     Back problem    cramps in lower back \"due to fibroids\"     Migraines    Uterine leiomyoma      Past Surgical History:   Procedure Laterality Date    Hysterectomy  08/01/2022    MAE BSO    Total abdominal hysterectomy N/A 8/1/2022    Procedure: TOTAL ABDOMINAL HYSTERECTOMY-BILATERAL SALPINGECTOMY;  Surgeon: Ana Burgos MD;  Location: [facility] MAIN OR      Social History     Socioeconomic History    Marital status: Life Partner   Tobacco Use    Smoking status: Never    Smokeless tobacco: Never   Vaping Use    Vaping status: Never Used   Substance and Sexual Activity    Alcohol use: Never    Drug use: Never          Review of Systems   Constitutional:  Negative for chills, fatigue and fever.   HENT:  Negative for ear pain, hearing loss, sinus pain, sore throat, trouble swallowing and voice change.    Eyes:  Negative for pain and visual disturbance.   Respiratory:  Negative for cough, chest tightness and shortness of breath.    Cardiovascular:  Negative for chest pain, palpitations and leg swelling.   Gastrointestinal:  Negative for abdominal pain, constipation, diarrhea, nausea and vomiting.   Endocrine: Negative for cold intolerance  and heat intolerance.   Genitourinary:  Negative for dysuria and hematuria.   Musculoskeletal:  Negative for back pain and joint swelling.   Skin:  Negative for rash.   Allergic/Immunologic: Negative for environmental allergies.   Neurological:  Negative for weakness, numbness and headaches.   Hematological:  Does not bruise/bleed easily.   Psychiatric/Behavioral:  Negative for dysphoric mood and sleep disturbance. The patient is nervous/anxious.               Current Outpatient Medications   Medication Sig Dispense Refill    triamcinolone 0.1 % External Cream Apply topically 2 (two) times daily. 45 g 0    ALPRAZolam (XANAX) 0.25 MG Oral Tab Take 1 tablet (0.25 mg total) by mouth 2 (two) times daily as needed. 30 tablet 0    ibuprofen 600 MG Oral Tab Take 1 tablet (600 mg total) by mouth every 6 (six) hours. 30 tablet 1    acetaminophen 500 MG Oral Tab Take 2 tablets (1,000 mg total) by mouth every 6 (six) hours as needed. 30 tablet 0     Allergies:Allergies[1]   PHYSICAL EXAM:   Physical Exam  Constitutional:       Appearance: Normal appearance. She is well-developed.   HENT:      Head: Normocephalic.      Right Ear: Tympanic membrane normal.      Left Ear: Tympanic membrane normal.      Nose: Nose normal.      Mouth/Throat:      Mouth: Mucous membranes are moist.      Pharynx: No oropharyngeal exudate or posterior oropharyngeal erythema.   Eyes:      General:         Right eye: No discharge.         Left eye: No discharge.      Pupils: Pupils are equal, round, and reactive to light.   Cardiovascular:      Rate and Rhythm: Normal rate and regular rhythm.      Heart sounds: Normal heart sounds. No murmur heard.     No friction rub. No gallop.   Pulmonary:      Effort: Pulmonary effort is normal. No respiratory distress.      Breath sounds: Normal breath sounds. No wheezing, rhonchi or rales.   Abdominal:      General: Bowel sounds are normal. There is no distension.      Palpations: Abdomen is soft. There is no  mass.      Tenderness: There is no abdominal tenderness. There is no right CVA tenderness, left CVA tenderness or guarding.   Musculoskeletal:         General: No tenderness.      Cervical back: Normal range of motion and neck supple. No tenderness.      Right lower leg: No edema.      Left lower leg: No edema.   Lymphadenopathy:      Cervical: No cervical adenopathy.   Skin:     General: Skin is warm and dry.      Findings: No rash.   Neurological:      Mental Status: She is alert and oriented to person, place, and time.      Coordination: Coordination normal.      Gait: Gait normal.   Psychiatric:         Mood and Affect: Mood normal.         Behavior: Behavior normal.         Thought Content: Thought content normal.         Judgment: Judgment normal.       /85 (BP Location: Left arm, Patient Position: Sitting, Cuff Size: adult)   Pulse 68   Ht 5' 4\" (1.626 m)   Wt 174 lb 12.8 oz (79.3 kg)   LMP 07/15/2022   SpO2 100%   BMI 30.00 kg/m²   Wt Readings from Last 2 Encounters:   10/14/24 174 lb 12.8 oz (79.3 kg)   10/07/24 174 lb 6.4 oz (79.1 kg)     Body mass index is 30 kg/m².(2)  Lab Results   Component Value Date    WBC 10.2 09/23/2024    RBC 5.17 09/23/2024    HGB 15.5 09/23/2024    HCT 45.8 09/23/2024    MCV 88.6 09/23/2024    MCH 30.0 09/23/2024    MCHC 33.8 09/23/2024    RDW 12.4 09/23/2024    .0 09/23/2024    MPV 7.8 01/21/2014      Lab Results   Component Value Date    GLU 96 09/23/2024    BUN 7 (L) 09/23/2024    BUNCREA 9.0 (L) 09/23/2024    CREATSERUM 0.78 09/23/2024    ANIONGAP 6 09/23/2024    GFRNAA 93 02/24/2022    GFRAA 107 02/24/2022    CA 9.4 09/23/2024    OSMOCALC 282 09/23/2024    ALKPHO 82 09/23/2024    AST 17 09/23/2024    ALT 14 09/23/2024    BILT 0.8 09/23/2024    TP 8.3 (H) 09/23/2024    ALB 5.0 (H) 09/23/2024    GLOBULIN 3.3 09/23/2024     09/23/2024    K 3.5 09/23/2024     09/23/2024    CO2 25.0 09/23/2024      Lab Results   Component Value Date    EAG 97  07/01/2024    A1C 5.0 07/01/2024      Lab Results   Component Value Date    CHOLEST 201 (H) 07/01/2024    TRIG 161 (H) 07/01/2024    HDL 25 (L) 07/01/2024     (H) 07/01/2024    VLDL 30 07/01/2024    NONHDLC 176 (H) 07/01/2024      Lab Results   Component Value Date    TSH 1.776 07/01/2024                ASSESSMENT/PLAN:     Problem List Items Addressed This Visit       Annual physical exam - Primary     Normal exam.  Labs as ordered.  Skin check normal.  No significant abnormal nevi.  Breast exam completed-no palpable abnormalities, discharge from the nipples or axillary adenopathy.  No cervical or inguinal lymphadenopathy.  Hernial orifices intact.    Immunizations- Flu today               Orders Placed This Encounter   Procedures    Fluzone trivalent vaccine, PF 0.5mL, 6mo+ (54911)       Meds This Visit:  Requested Prescriptions      No prescriptions requested or ordered in this encounter       Imaging & Referrals:  INFLUENZA VACCINE, TRI, PRESERV FREE, 0.5 ML     Please follow with Ana LING/GYNE    CIRO Robb          [1]   Allergies  Allergen Reactions    Corn UNKNOWN    Shrimp UNKNOWN    Wheat UNKNOWN

## 2024-10-15 NOTE — ASSESSMENT & PLAN NOTE
Normal exam.  Labs as ordered.  Skin check normal.  No significant abnormal nevi.  Breast exam completed-no palpable abnormalities, discharge from the nipples or axillary adenopathy.  No cervical or inguinal lymphadenopathy.  Hernial orifices intact.    Immunizations- Flu today

## 2024-10-28 ENCOUNTER — TELEPHONE (OUTPATIENT)
Dept: INTERNAL MEDICINE CLINIC | Facility: CLINIC | Age: 44
End: 2024-10-28

## 2024-10-28 DIAGNOSIS — E55.9 VITAMIN D DEFICIENCY: Primary | ICD-10-CM

## 2024-10-28 NOTE — TELEPHONE ENCOUNTER
Patient called (identified name and ),   Had physical exam 10/14/2024.  She completed the Vitamin D at 50,000 units.  States Sophia TANNER told her to start Vitamin D 2000 units, but did not send script to pharmacy.  Advised would likely need to purchase over the counter.  Sophia TANNER, please advise if you want her to recheck Vitamin D level?  Pended.

## 2024-11-01 ENCOUNTER — TELEPHONE (OUTPATIENT)
Dept: SURGERY | Facility: CLINIC | Age: 44
End: 2024-11-01

## 2024-11-01 NOTE — TELEPHONE ENCOUNTER
Called and spoke with patient. She stated that her nipple changes have improved with the prescribed cream. I encouraged her to continue to use the trimcinolone and update me in 1-2 weeks. Patient verbalized understanding.

## 2024-11-12 ENCOUNTER — HOSPITAL ENCOUNTER (OUTPATIENT)
Dept: CT IMAGING | Facility: HOSPITAL | Age: 44
Discharge: HOME | End: 2024-11-12
Attending: NURSE PRACTITIONER

## 2024-11-12 DIAGNOSIS — R07.89 CHEST DISCOMFORT: ICD-10-CM

## 2024-11-12 LAB
POCT GLUCOSE CHOLESTECH: 115 (ref 70–99)
POCT HDL: 18 (ref 40–60)
POCT LDL: 136 (ref 0–99)
POCT TOTAL CHOLESTEROL: 181 (ref 110–200)
POCT TRIGLYCERIDES: 135 (ref 1–149)

## 2024-11-13 NOTE — PROGRESS NOTES
Date of Service 11/12/2024    PILI MCCALL  Date of Birth 2/16/1980    Patient Age: 44 year old    PCP: Jack Farrar MD  [address] [place] [id number]    Heart Scan Consult  Preliminary Heart Scan Score: 57.9    Previous Screening  Heart Scan Completed Previously: No        Peripheral Vascular Scan Completed Previously: No          Risk Factors  Personal Risk Factors  Non-alterable Risk Factors: Family History (Reports, Father had stent placement at the age of 70)  Alterable Risk Factors: High Blood Pressure;Abnormal Cholesterol;Lack of exercise;Stress      Body Mass Index  There is no height or weight on file to calculate BMI.    Blood Pressure    (Normal =< 120/80,  Elevated = 120-129/ >80,  High Stage1 130-139/80-89 , Stage2 >140/>90)    Lipid Profile  Patient was in fasting state: No    Cholesterol: 181, done on 11/12/2024.  HDL Cholesterol: 18, done on 11/12/2024.  LDL Cholesterol: 136, done on 11/12/2024.  TriGlycerides 135, done on 11/12/2024.    Cholesterol Goals  Value   Total  =< 200   HDL  = > 45 Men = > 55 Women   LDL   =< 100   Triglycerides  =< 150       Glucose and Hemoglobin A1C  Lab Results   Component Value Date    PGLU 115 (A) 11/12/2024    GLU 96 09/23/2024    A1C 5.0 07/01/2024     (Normal Non Fasting Glucose < 140mg/dl )    Nurse Review  Risk factor information and results reviewed with Nurse: Yes    Recommended Follow Up:  Consult your physician regarding:: Final Heart Scan Report;Discuss Potential for Score Variance;Discuss potential for Incidental Finding;Cholesterol Results (Non-Fasting);Exercise Program Clearance      Recommendations for Change:  Nutrition Changes: Low Saturated Fat;Low Fat Dairy;Low Salt Eating;Increase Fiber    Cholesterol Modification (goal of therapy depends upon your risk): Increase HDL (Healthy/Good) Normal >45 Men >55 Women;Decrease LDL (Lousy/Bad) Ideal <100    Exercise: Enhance Current Program (Get exercise program clearance from doctor first)          Weight Management: Decrease Current Weight (Reports losing 10lbs already)    Stress Management: Adopt Stress Management Techniques    Repeat Heart Scan: Discuss with your Physician;3 Years if Calcium Score is > 0.0              [place] Recommended Resources:  Recommended Resources: Upcoming Classes, Medical Services and Health Library [email or web address]     Other Resources:: Handouts given - Controlling Risk Factors, Stress, and Cholesterol      Martinez TUCKER, RN        Please Contact the Nurse Heart Line with any Questions or Concerns [phone].

## 2024-11-14 DIAGNOSIS — E78.2 MIXED HYPERLIPIDEMIA: Primary | ICD-10-CM

## 2024-11-14 RX ORDER — ATORVASTATIN CALCIUM 20 MG/1
20 TABLET, FILM COATED ORAL NIGHTLY
Qty: 30 TABLET | Refills: 3 | Status: SHIPPED | OUTPATIENT
Start: 2024-11-14

## 2025-02-08 DIAGNOSIS — R23.4 BREAST SKIN CHANGES: ICD-10-CM

## 2025-02-10 RX ORDER — TRIAMCINOLONE ACETONIDE 1 MG/G
CREAM TOPICAL 2 TIMES DAILY
Qty: 45 G | Refills: 0 | Status: SHIPPED | OUTPATIENT
Start: 2025-02-10

## 2025-02-25 ENCOUNTER — OFFICE VISIT (OUTPATIENT)
Dept: INTERNAL MEDICINE CLINIC | Facility: CLINIC | Age: 45
End: 2025-02-25

## 2025-02-25 VITALS
HEIGHT: 64 IN | RESPIRATION RATE: 18 BRPM | SYSTOLIC BLOOD PRESSURE: 133 MMHG | TEMPERATURE: 99 F | WEIGHT: 174 LBS | BODY MASS INDEX: 29.71 KG/M2 | DIASTOLIC BLOOD PRESSURE: 86 MMHG | HEART RATE: 64 BPM | OXYGEN SATURATION: 98 %

## 2025-02-25 DIAGNOSIS — E78.2 MODERATE MIXED HYPERLIPIDEMIA NOT REQUIRING STATIN THERAPY: ICD-10-CM

## 2025-02-25 DIAGNOSIS — F41.9 ANXIETY: ICD-10-CM

## 2025-02-25 DIAGNOSIS — Z12.11 ENCOUNTER FOR SCREENING COLONOSCOPY: Primary | ICD-10-CM

## 2025-02-25 DIAGNOSIS — R21 RASH: ICD-10-CM

## 2025-02-25 PROCEDURE — 3008F BODY MASS INDEX DOCD: CPT | Performed by: NURSE PRACTITIONER

## 2025-02-25 PROCEDURE — 3075F SYST BP GE 130 - 139MM HG: CPT | Performed by: NURSE PRACTITIONER

## 2025-02-25 PROCEDURE — 3079F DIAST BP 80-89 MM HG: CPT | Performed by: NURSE PRACTITIONER

## 2025-02-25 PROCEDURE — 99214 OFFICE O/P EST MOD 30 MIN: CPT | Performed by: NURSE PRACTITIONER

## 2025-02-25 RX ORDER — ROSUVASTATIN CALCIUM 5 MG/1
5 TABLET, COATED ORAL DAILY
Qty: 90 TABLET | Refills: 1 | Status: SHIPPED | OUTPATIENT
Start: 2025-02-25

## 2025-02-25 NOTE — PROGRESS NOTES
HPI:    Patient ID: Stacie Milian is a 45 year old female.  Work was so stressful.  Work stress is better  HPI Follow up on Anxiety  45 year old female here for follow up appointment.  She is feeling better with less anxiety. She thinks work was making her anxious.  Therapy has been helping.  She thinks the atorvastion was making her anxious so she discontinued the medication.    Rash on Breasts  Rash on breast hasi improved with the Triamcinolone ointment. When she decreases the application the rash seems to return.    Hyperlipidemia    Blood pressure 133/86, pulse 64, temperature 98.6 °F (37 °C), temperature source Tympanic, resp. rate 18, height 5' 4\" (1.626 m), weight 174 lb (78.9 kg), last menstrual period 07/15/2022, SpO2 98%.     Wt Readings from Last 6 Encounters:   02/25/25 174 lb (78.9 kg)   10/14/24 174 lb 12.8 oz (79.3 kg)   10/07/24 174 lb 6.4 oz (79.1 kg)   09/23/24 177 lb (80.3 kg)   07/29/24 188 lb (85.3 kg)   06/18/24 188 lb (85.3 kg)      Immunization History   Administered Date(s) Administered    Covid-19 Vaccine Velia (J&J) 0.5ml 03/26/2021, 02/10/2022    FLUZONE 6 months and older PFS 0.5 ml (04474) 01/15/2024    Influenza 10/14/2008    Influenza Vaccine, trivalent (IIV3), PF 0.5mL (10745) 10/14/2024    TDAP 01/15/2014       Past Medical History:    Anxiety state    over the past three months getting \"panic attacks\"     Back problem    cramps in lower back \"due to fibroids\"     Migraines    Uterine leiomyoma      Past Surgical History:   Procedure Laterality Date    Hysterectomy  08/01/2022    MAE BSO    Total abdominal hysterectomy N/A 8/1/2022    Procedure: TOTAL ABDOMINAL HYSTERECTOMY-BILATERAL SALPINGECTOMY;  Surgeon: Ana Burgos MD;  [facility] MA[facility]      Social History     Socioeconomic History    Marital status: Life Partner   Tobacco Use    Smoking status: Never    Smokeless tobacco: Never   Vaping Use    Vaping status: Never Used   Substance and Sexual Activity     Alcohol use: Never    Drug use: Never          Review of Systems           Current Outpatient Medications   Medication Sig Dispense Refill    rosuvastatin (CRESTOR) 5 MG Oral Tab Take 1 tablet (5 mg total) by mouth daily. 90 tablet 1    triamcinolone 0.1 % External Cream Apply topically 2 (two) times daily. 45 g 0    ALPRAZolam (XANAX) 0.25 MG Oral Tab Take 1 tablet (0.25 mg total) by mouth 2 (two) times daily as needed. 30 tablet 0    ibuprofen 600 MG Oral Tab Take 1 tablet (600 mg total) by mouth every 6 (six) hours. 30 tablet 1    acetaminophen 500 MG Oral Tab Take 2 tablets (1,000 mg total) by mouth every 6 (six) hours as needed. 30 tablet 0     Allergies:Allergies[1]   PHYSICAL EXAM:   Physical Exam  /86 (BP Location: Left arm, Patient Position: Sitting, Cuff Size: adult)   Pulse 64   Temp 98.6 °F (37 °C) (Tympanic)   Resp 18   Ht 5' 4\" (1.626 m)   Wt 174 lb (78.9 kg)   LMP 07/15/2022   SpO2 98%   BMI 29.87 kg/m²   Wt Readings from Last 2 Encounters:   02/25/25 174 lb (78.9 kg)   10/14/24 174 lb 12.8 oz (79.3 kg)     Body mass index is 29.87 kg/m².(2)  Lab Results   Component Value Date    WBC 10.2 09/23/2024    RBC 5.17 09/23/2024    HGB 15.5 09/23/2024    HCT 45.8 09/23/2024    MCV 88.6 09/23/2024    MCH 30.0 09/23/2024    MCHC 33.8 09/23/2024    RDW 12.4 09/23/2024    .0 09/23/2024    MPV 7.8 01/21/2014      Lab Results   Component Value Date    GLU 96 09/23/2024    BUN 7 (L) 09/23/2024    BUNCREA 9.0 (L) 09/23/2024    CREATSERUM 0.78 09/23/2024    ANIONGAP 6 09/23/2024    GFRNAA 93 02/24/2022    GFRAA 107 02/24/2022    CA 9.4 09/23/2024    OSMOCALC 282 09/23/2024    ALKPHO 82 09/23/2024    AST 17 09/23/2024    ALT 14 09/23/2024    BILT 0.8 09/23/2024    TP 8.3 (H) 09/23/2024    ALB 5.0 (H) 09/23/2024    GLOBULIN 3.3 09/23/2024     09/23/2024    K 3.5 09/23/2024     09/23/2024    CO2 25.0 09/23/2024      Lab Results   Component Value Date    EAG 97 07/01/2024    A1C 5.0  07/01/2024      Lab Results   Component Value Date    CHOLEST 181 11/12/2024    TRIG 135 11/12/2024    HDL 18 (A) 11/12/2024     (A) 11/12/2024    VLDL 30 07/01/2024    NONHDLC 176 (H) 07/01/2024      Lab Results   Component Value Date    TSH 1.776 07/01/2024                ASSESSMENT/PLAN:     Problem List Items Addressed This Visit       Anxiety     Anxiety improved with seeing therapist         Moderate mixed hyperlipidemia not requiring statin therapy     Patient CT calciu score showing minor plaque.    Plan  She did not tolerate atorvastatin- It made her anxious    Trial Crestor 5mg po nightly         Relevant Medications    rosuvastatin (CRESTOR) 5 MG Oral Tab    Rash     Patient continues with bilateral breast rash.  Was suppose to have a Breast MRI which she did not complete yet.    Plan  Follow up with MRI of breast          Other Visit Diagnoses       Encounter for screening colonoscopy    -  Primary    Relevant Orders    [facility] - INTERNAL               No orders of the defined types were placed in this encounter.      Meds This Visit:  Requested Prescriptions     Signed Prescriptions Disp Refills    rosuvastatin (CRESTOR) 5 MG Oral Tab 90 tablet 1     Sig: Take 1 tablet (5 mg total) by mouth daily.       Imaging & Referrals:  [facility] - INTERNAL         CIRO Robb          [1]   Allergies  Allergen Reactions    Corn UNKNOWN    Shrimp UNKNOWN    Wheat UNKNOWN

## 2025-02-26 NOTE — ASSESSMENT & PLAN NOTE
Patient CT calciu score showing minor plaque.    Plan  She did not tolerate atorvastatin- It made her anxious    Trial Crestor 5mg po nightly

## 2025-02-26 NOTE — ASSESSMENT & PLAN NOTE
Patient continues with bilateral breast rash.  Was suppose to have a Breast MRI which she did not complete yet.    Plan  Follow up with MRI of breast   no

## 2025-03-05 ENCOUNTER — NURSE ONLY (OUTPATIENT)
Facility: CLINIC | Age: 45
End: 2025-03-05

## 2025-03-05 DIAGNOSIS — Z12.11 COLON CANCER SCREENING: Primary | ICD-10-CM

## 2025-03-05 NOTE — PROGRESS NOTES
Reviewed, okay to schedule    GI Schedulers-  Procedure: colonoscopy  Provider: general pool endoscopist  Dx: Colorectal cancer screening  Sedation: MAC (hx anxiety)  Prep: Split dose golytely    Thank you.  CIRO Green

## 2025-03-05 NOTE — PROGRESS NOTES
Rema,  Patient stated that she had an anxiety episode which triggered chest pain and shortness of breath 3 months ago. Please provide orders for Colonoscopy if agreeable.    Called patient for scheduled telephone colon screen.   Please advise on colonoscopy and bowel prep orders.   Medications, pharmacy, and allergies reviewed.     Age 45-76 y/o: Yes  › MD preference: None  › Insurance:  [employer] [place]PO  › Last PCP visit: 2/25/25  › Last CBC: 9/23/24  › Date of positive FIT (if applicable): N/A  › H/W/BMI: 5'4/174.0 Lbs/ 29.85 BMI    Special comments/notes:  Telephone Colon Screening Questionnaire Yes No   Are you currently experiencing any GI symptoms [] [x]   If yes, explain:     Rectal bleeding [] [x]   Black stool [] [x]   Dysphagia or food \"feeling stuck\" when eating [] [x]   Intractable vomiting [] [x]   Unexplained weight loss [] [x]   First colonoscopy [x] []   Family history of colon cancer [] [x]   Any issues with anesthesia [] [x]   If yes, explain:      Any recent complaints related to chest pain &/or shortness of breath Anxiety episode 3 months ago [x] []   Referred to a cardiologist?  [] [x]   If yes, explain:      History of  respiratory issues/oxygen/ROSE/COPD [] [x]   CPAP/BiPAP:     History of devices (pacemaker/defibrillator) [] [x]   History of heart attack &/or stroke [] [x]   If yes, in the last 12 months? Stent placement?  [] [x]     Medication Reconciliation  Yes  No   Anticoagulants (except Aspirin) [] [x]   Diabetic Medication [] [x]   Weight loss medication (phentermine/vyvanse/saxsenda/etc) [] [x]   Iron/herbal/multivitamin supplement(s) Vitamin D [x] []   Usage of marijuana, CBD &/or vape product(s) [] [x]

## 2025-04-17 ENCOUNTER — ORDER TRANSCRIPTION (OUTPATIENT)
Dept: ADMINISTRATIVE | Facility: HOSPITAL | Age: 45
End: 2025-04-17

## 2025-04-17 DIAGNOSIS — Z12.31 ENCOUNTER FOR SCREENING MAMMOGRAM FOR MALIGNANT NEOPLASM OF BREAST: Primary | ICD-10-CM

## 2025-04-28 ENCOUNTER — OFFICE VISIT (OUTPATIENT)
Dept: INTERNAL MEDICINE CLINIC | Facility: CLINIC | Age: 45
End: 2025-04-28

## 2025-04-28 VITALS
WEIGHT: 184 LBS | HEART RATE: 67 BPM | BODY MASS INDEX: 31.41 KG/M2 | HEIGHT: 64 IN | TEMPERATURE: 98 F | OXYGEN SATURATION: 100 % | DIASTOLIC BLOOD PRESSURE: 94 MMHG | SYSTOLIC BLOOD PRESSURE: 148 MMHG | RESPIRATION RATE: 16 BRPM

## 2025-04-28 DIAGNOSIS — E78.2 MODERATE MIXED HYPERLIPIDEMIA NOT REQUIRING STATIN THERAPY: Primary | ICD-10-CM

## 2025-04-28 DIAGNOSIS — F41.9 ANXIETY: ICD-10-CM

## 2025-04-28 DIAGNOSIS — R21 RASH: ICD-10-CM

## 2025-04-28 PROCEDURE — 99214 OFFICE O/P EST MOD 30 MIN: CPT | Performed by: NURSE PRACTITIONER

## 2025-04-28 PROCEDURE — 3077F SYST BP >= 140 MM HG: CPT | Performed by: NURSE PRACTITIONER

## 2025-04-28 PROCEDURE — 3080F DIAST BP >= 90 MM HG: CPT | Performed by: NURSE PRACTITIONER

## 2025-04-28 PROCEDURE — 3008F BODY MASS INDEX DOCD: CPT | Performed by: NURSE PRACTITIONER

## 2025-04-28 NOTE — ASSESSMENT & PLAN NOTE
Rash is gone.  Was suppose to have a Breast MRI which she did not complete yet.     Plan  Follow up with MRI of breast  Follow up Mammogram

## 2025-04-28 NOTE — PROGRESS NOTES
HPI:    Patient ID: Stacie Milian is a 45 year old female.    HPI Follow up visit.  45 year old female I know well.  She has hx of anxiety.  She had stated that the atorvastatin was making her anxious.  -Last visit we changed to Crestor  She is tolerating Crestor with no adverse side effects.     Breast Rash-Following with breast specialist.  MRI of her breast was ordered in October.  No breast rash today.    Anxiety  Work Stress  Takes Xanax infrequently     Colonoscopy scheduled for 7/2025    Immunization History   Administered Date(s) Administered    Covid-19 Vaccine "[employer] (J&J) 0.5ml 03/26/2021, 02/10/2022    FLUZONE 6 months and older PFS 0.5 ml (53232) 01/15/2024    Influenza 10/14/2008    Influenza Vaccine, trivalent (IIV3), PF 0.5mL (81495) 10/14/2024    TDAP 01/15/2014       Past Medical History[1]   Past Surgical History[2]   Social Hx on file[3]       Review of Systems   Constitutional:  Negative for chills, fatigue and fever.   HENT:  Negative for ear pain, hearing loss, sinus pain, sore throat and trouble swallowing.    Eyes:  Negative for pain and visual disturbance.   Respiratory:  Negative for cough, chest tightness and shortness of breath.    Cardiovascular:  Negative for chest pain, palpitations and leg swelling.   Gastrointestinal:  Negative for abdominal pain, constipation, diarrhea, nausea and vomiting.   Endocrine: Negative for cold intolerance and heat intolerance.   Genitourinary:  Negative for dysuria and hematuria.   Musculoskeletal:  Negative for back pain and joint swelling.   Skin:  Negative for rash.   Allergic/Immunologic: Negative for environmental allergies.   Neurological:  Negative for weakness, numbness and headaches.   Hematological:  Does not bruise/bleed easily.   Psychiatric/Behavioral:  Negative for dysphoric mood and sleep disturbance. The patient is not nervous/anxious.             Current Medications[4]  Allergies:Allergies[5]   PHYSICAL EXAM:   Physical  Exam  Constitutional:       Appearance: Normal appearance. She is well-developed.   HENT:      Head: Normocephalic.      Right Ear: Tympanic membrane normal.      Left Ear: Tympanic membrane normal.      Nose: Nose normal.      Mouth/Throat:      Mouth: Mucous membranes are moist.      Pharynx: No oropharyngeal exudate or posterior oropharyngeal erythema.   Eyes:      General:         Right eye: No discharge.         Left eye: No discharge.      Pupils: Pupils are equal, round, and reactive to light.   Cardiovascular:      Rate and Rhythm: Normal rate and regular rhythm.      Heart sounds: Normal heart sounds. No murmur heard.     No friction rub. No gallop.   Pulmonary:      Effort: Pulmonary effort is normal. No respiratory distress.      Breath sounds: Normal breath sounds. No wheezing, rhonchi or rales.   Abdominal:      General: Bowel sounds are normal. There is no distension.      Palpations: Abdomen is soft. There is no mass.      Tenderness: There is no abdominal tenderness. There is no right CVA tenderness, left CVA tenderness or guarding.   Musculoskeletal:         General: No tenderness.      Cervical back: Normal range of motion and neck supple. No tenderness.      Right lower leg: No edema.      Left lower leg: No edema.   Lymphadenopathy:      Cervical: No cervical adenopathy.   Skin:     General: Skin is warm and dry.      Findings: No rash.   Neurological:      Mental Status: She is alert and oriented to person, place, and time.      Coordination: Coordination normal.      Gait: Gait normal.   Psychiatric:         Mood and Affect: Mood normal.         Behavior: Behavior normal.         Thought Content: Thought content normal.         Judgment: Judgment normal.       BP (!) 148/94   Pulse 67   Temp 98.3 °F (36.8 °C) (Temporal)   Resp 16   Ht 5' 4\" (1.626 m)   Wt 184 lb (83.5 kg)   LMP 07/15/2022   SpO2 100%   BMI 31.58 kg/m²   Wt Readings from Last 2 Encounters:   04/28/25 184 lb (83.5 kg)    02/25/25 174 lb (78.9 kg)     Body mass index is 31.58 kg/m².(2)  Lab Results   Component Value Date    WBC 10.2 09/23/2024    RBC 5.17 09/23/2024    HGB 15.5 09/23/2024    HCT 45.8 09/23/2024    MCV 88.6 09/23/2024    MCH 30.0 09/23/2024    MCHC 33.8 09/23/2024    RDW 12.4 09/23/2024    .0 09/23/2024    MPV 7.8 01/21/2014      Lab Results   Component Value Date    GLU 96 09/23/2024    BUN 7 (L) 09/23/2024    BUNCREA 9.0 (L) 09/23/2024    CREATSERUM 0.78 09/23/2024    ANIONGAP 6 09/23/2024    GFRNAA 93 02/24/2022    GFRAA 107 02/24/2022    CA 9.4 09/23/2024    OSMOCALC 282 09/23/2024    ALKPHO 82 09/23/2024    AST 17 09/23/2024    ALT 14 09/23/2024    BILT 0.8 09/23/2024    TP 8.3 (H) 09/23/2024    ALB 5.0 (H) 09/23/2024    GLOBULIN 3.3 09/23/2024     09/23/2024    K 3.5 09/23/2024     09/23/2024    CO2 25.0 09/23/2024      Lab Results   Component Value Date    EAG 97 07/01/2024    A1C 5.0 07/01/2024      Lab Results   Component Value Date    CHOLEST 181 11/12/2024    TRIG 135 11/12/2024    HDL 18 (A) 11/12/2024     (A) 11/12/2024    VLDL 30 07/01/2024    NONHDLC 176 (H) 07/01/2024      Lab Results   Component Value Date    TSH 1.776 07/01/2024                ASSESSMENT/PLAN:     Assessment & Plan  Moderate mixed hyperlipidemia not requiring statin therapy  Patient CT calcium score showing minor plaque.     Plan  She did not tolerate atorvastatin- It made her anxious     Crestor 5mg po nightly is working for her.  Will get CMP and Lipid panel this weekend.       Rash  Rash is gone.  Was suppose to have a Breast MRI which she did not complete yet.     Plan  Follow up with MRI of breast  Follow up Mammogram         Anxiety  Xanax only if she has extreme anxiety             No orders of the defined types were placed in this encounter.      Meds This Visit:  Requested Prescriptions      No prescriptions requested or ordered in this encounter       Imaging & Referrals:  None         Sophia  CIRO Medrano          [1]   Past Medical History:   Anxiety state    over the past three months getting \"panic attacks\"     Back problem    cramps in lower back \"due to fibroids\"     Migraines    Uterine leiomyoma   [2]   Past Surgical History:  Procedure Laterality Date    Hysterectomy  08/01/2022    [facility]    Total abdominal hysterectomy N/A 8/1/2022    Procedure: TOTAL ABDOMINAL HYSTERECTOMY-BILATERAL SALPINGECTOMY;  Surgeon: Ana Burgos MD;  Location: [facility] MAIN OR   [3]   Social History  Socioeconomic History    Marital status: Life Partner   Tobacco Use    Smoking status: Never    Smokeless tobacco: Never   Vaping Use    Vaping status: Never Used   Substance and Sexual Activity    Alcohol use: Never    Drug use: Never   [4]   Current Outpatient Medications   Medication Sig Dispense Refill    rosuvastatin (CRESTOR) 5 MG Oral Tab Take 1 tablet (5 mg total) by mouth daily. 90 tablet 1    ALPRAZolam (XANAX) 0.25 MG Oral Tab Take 1 tablet (0.25 mg total) by mouth 2 (two) times daily as needed. 30 tablet 0    acetaminophen 500 MG Oral Tab Take 2 tablets (1,000 mg total) by mouth every 6 (six) hours as needed. 30 tablet 0    polyethylene glycol, PEG 3350-KCl-NaBcb-NaCl-NaSulf, 236 g Oral Recon Soln Take 4,000 mL by mouth As Directed. Take 2,000 mL the night before your procedure and 2,000 mL the morning of your procedure. Take as directed by GI clinic. Okay to substitute for generic. (Patient not taking: Reported on 4/28/2025) 1 each 0    triamcinolone 0.1 % External Cream Apply topically 2 (two) times daily. (Patient not taking: Reported on 4/28/2025) 45 g 0    ibuprofen 600 MG Oral Tab Take 1 tablet (600 mg total) by mouth every 6 (six) hours. (Patient not taking: Reported on 4/28/2025) 30 tablet 1   [5]   Allergies  Allergen Reactions    Corn UNKNOWN    Shrimp UNKNOWN    Wheat UNKNOWN

## 2025-04-28 NOTE — ASSESSMENT & PLAN NOTE
Patient CT calcium score showing minor plaque.     Plan  She did not tolerate atorvastatin- It made her anxious     Crestor 5mg po nightly is working for her.  Will get CMP and Lipid panel this weekend.

## 2025-05-05 ENCOUNTER — LAB ENCOUNTER (OUTPATIENT)
Dept: LAB | Facility: HOSPITAL | Age: 45
End: 2025-05-05
Attending: NURSE PRACTITIONER
Payer: COMMERCIAL

## 2025-05-05 DIAGNOSIS — E55.9 VITAMIN D DEFICIENCY: ICD-10-CM

## 2025-05-05 DIAGNOSIS — E78.2 MIXED HYPERLIPIDEMIA: ICD-10-CM

## 2025-05-05 LAB
ALBUMIN SERPL-MCNC: 5.1 G/DL (ref 3.2–4.8)
ALBUMIN/GLOB SERPL: 2 {RATIO} (ref 1–2)
ALP LIVER SERPL-CCNC: 71 U/L (ref 37–98)
ALT SERPL-CCNC: 12 U/L (ref 10–49)
ANION GAP SERPL CALC-SCNC: 9 MMOL/L (ref 0–18)
AST SERPL-CCNC: 14 U/L (ref ?–34)
BILIRUB SERPL-MCNC: 0.7 MG/DL (ref 0.3–1.2)
BUN BLD-MCNC: 11 MG/DL (ref 9–23)
BUN/CREAT SERPL: 12.9 (ref 10–20)
CALCIUM BLD-MCNC: 9.4 MG/DL (ref 8.7–10.4)
CHLORIDE SERPL-SCNC: 104 MMOL/L (ref 98–112)
CHOLEST SERPL-MCNC: 132 MG/DL (ref ?–200)
CO2 SERPL-SCNC: 25 MMOL/L (ref 21–32)
CREAT BLD-MCNC: 0.85 MG/DL (ref 0.55–1.02)
EGFRCR SERPLBLD CKD-EPI 2021: 86 ML/MIN/1.73M2 (ref 60–?)
FASTING PATIENT LIPID ANSWER: YES
FASTING STATUS PATIENT QL REPORTED: YES
GLOBULIN PLAS-MCNC: 2.5 G/DL (ref 2–3.5)
GLUCOSE BLD-MCNC: 92 MG/DL (ref 70–99)
HDLC SERPL-MCNC: 26 MG/DL (ref 40–59)
LDLC SERPL CALC-MCNC: 79 MG/DL (ref ?–100)
NONHDLC SERPL-MCNC: 106 MG/DL (ref ?–130)
OSMOLALITY SERPL CALC.SUM OF ELEC: 285 MOSM/KG (ref 275–295)
POTASSIUM SERPL-SCNC: 3.9 MMOL/L (ref 3.5–5.1)
PROT SERPL-MCNC: 7.6 G/DL (ref 5.7–8.2)
SODIUM SERPL-SCNC: 138 MMOL/L (ref 136–145)
TRIGL SERPL-MCNC: 150 MG/DL (ref 30–149)
VIT D+METAB SERPL-MCNC: 34.8 NG/ML (ref 30–100)
VLDLC SERPL CALC-MCNC: 24 MG/DL (ref 0–30)

## 2025-05-05 PROCEDURE — 80053 COMPREHEN METABOLIC PANEL: CPT

## 2025-05-05 PROCEDURE — 36415 COLL VENOUS BLD VENIPUNCTURE: CPT

## 2025-05-05 PROCEDURE — 80061 LIPID PANEL: CPT

## 2025-05-05 PROCEDURE — 82306 VITAMIN D 25 HYDROXY: CPT

## 2025-05-18 DIAGNOSIS — F41.9 ANXIETY: ICD-10-CM

## 2025-05-18 DIAGNOSIS — R23.4 BREAST SKIN CHANGES: ICD-10-CM

## 2025-05-19 RX ORDER — TRIAMCINOLONE ACETONIDE 1 MG/G
CREAM TOPICAL 2 TIMES DAILY
Qty: 45 G | Refills: 0 | OUTPATIENT
Start: 2025-05-19

## 2025-05-20 RX ORDER — ALPRAZOLAM 0.25 MG
0.25 TABLET ORAL 2 TIMES DAILY PRN
Qty: 30 TABLET | Refills: 0 | Status: SHIPPED | OUTPATIENT
Start: 2025-05-20

## 2025-05-20 RX ORDER — ROSUVASTATIN CALCIUM 5 MG/1
5 TABLET, COATED ORAL DAILY
Qty: 90 TABLET | Refills: 1 | OUTPATIENT
Start: 2025-05-20

## 2025-05-20 NOTE — TELEPHONE ENCOUNTER
Rx failed [employer] protocol. Please review.     Alprazolam 0.25 MG: last filled on 24    Rosuvastatin 5 M month supply sent on 25 to [facility].    Sent SeeMe message to patient.

## 2025-06-21 ENCOUNTER — HOSPITAL ENCOUNTER (OUTPATIENT)
Dept: MAMMOGRAPHY | Facility: HOSPITAL | Age: 45
Discharge: HOME | End: 2025-06-21
Attending: NURSE PRACTITIONER
Payer: COMMERCIAL

## 2025-06-21 DIAGNOSIS — Z12.31 ENCOUNTER FOR SCREENING MAMMOGRAM FOR MALIGNANT NEOPLASM OF BREAST: ICD-10-CM

## 2025-06-21 PROCEDURE — 77063 BREAST TOMOSYNTHESIS BI: CPT | Performed by: NURSE PRACTITIONER

## 2025-06-21 PROCEDURE — 77067 SCR MAMMO BI INCL CAD: CPT | Performed by: NURSE PRACTITIONER

## 2025-06-27 ENCOUNTER — TELEPHONE (OUTPATIENT)
Facility: CLINIC | Age: 45
End: 2025-06-27

## 2025-06-27 NOTE — TELEPHONE ENCOUNTER
Patient was called to confirm upcoming procedure.  Confirmed location, date, medications, prep  and insurance.    Patient is aware to hold SUPPLEMENTS AND VITAMINS FOR 1 WEEK.    Patient had prep questions, which were addressed appropriately.     Patient verbally understood.

## 2025-07-02 ENCOUNTER — TELEPHONE (OUTPATIENT)
Facility: CLINIC | Age: 45
End: 2025-07-02

## 2025-07-02 NOTE — TELEPHONE ENCOUNTER
Patient has questions regarding preparation instructions for 7/7/2025 colonoscopy.  Please call.  Thank you.

## 2025-07-02 NOTE — TELEPHONE ENCOUNTER
Spoke with patient went over bowel prep and isntructions, states she was unable to  bowel prep from pharmacy. Called [facility]. Called and said they will have ready.

## 2025-07-07 PROBLEM — K63.5 POLYP OF COLON: Status: ACTIVE | Noted: 2025-07-07

## 2025-07-07 PROCEDURE — 88305 TISSUE EXAM BY PATHOLOGIST: CPT | Performed by: INTERNAL MEDICINE

## 2025-07-28 ENCOUNTER — OFFICE VISIT (OUTPATIENT)
Dept: INTERNAL MEDICINE CLINIC | Facility: CLINIC | Age: 45
End: 2025-07-28

## 2025-07-28 VITALS
TEMPERATURE: 97 F | RESPIRATION RATE: 18 BRPM | BODY MASS INDEX: 32.61 KG/M2 | HEART RATE: 69 BPM | SYSTOLIC BLOOD PRESSURE: 149 MMHG | WEIGHT: 191 LBS | DIASTOLIC BLOOD PRESSURE: 88 MMHG | OXYGEN SATURATION: 98 % | HEIGHT: 64 IN

## 2025-07-28 DIAGNOSIS — R63.5 WEIGHT GAIN: Primary | ICD-10-CM

## 2025-07-28 PROCEDURE — 3077F SYST BP >= 140 MM HG: CPT | Performed by: NURSE PRACTITIONER

## 2025-07-28 PROCEDURE — 3008F BODY MASS INDEX DOCD: CPT | Performed by: NURSE PRACTITIONER

## 2025-07-28 PROCEDURE — 99214 OFFICE O/P EST MOD 30 MIN: CPT | Performed by: NURSE PRACTITIONER

## 2025-07-28 PROCEDURE — 3079F DIAST BP 80-89 MM HG: CPT | Performed by: NURSE PRACTITIONER

## 2025-07-28 NOTE — PROGRESS NOTES
HPI:        The following individual(s) verbally consented to be recorded using ambient AI listening technology and understand that they can each withdraw their consent to this listening technology at any point by asking the clinician to turn off or pause the recording:    Patient name: Stacie Milian         Vitals:    07/28/25 1034   BP: 149/88   Pulse: 69   Resp: 18   Temp: 97.3 °F (36.3 °C)       Patient ID: Stacie Milian is a 45 year old female.  Wt Readings from Last 6 Encounters:   07/28/25 191 lb (86.6 kg)   06/30/25 184 lb (83.5 kg)   04/28/25 184 lb (83.5 kg)   02/25/25 174 lb (78.9 kg)   10/14/24 174 lb 12.8 oz (79.3 kg)   10/07/24 174 lb 6.4 oz (79.1 kg)      HPI    History of Present Illness  Stacie Milian is a 45 year old female who presents with concerns about weight gain and follow-up after a colonoscopy.    She has experienced a rapid weight increase over the past month, which she finds unexpected. She reports eating well and walking for exercise. She is concerned about the unexpected nature of this weight gain.    She recently underwent a colonoscopy where two hyperplastic polyps were found and removed. She did not receive any follow-up instructions or information regarding the timing of the next colonoscopy, leading to uncertainty about the results and next steps.    She has not been taking her vitamin D supplements since the colonoscopy but plans to resume taking 2000 units daily. She is currently on cholesterol medication, although the specific medication and dosage were not discussed.    A rash on her breast has resolved, and she had a mammogram after which she received a message that everything was good.    No anxiety and she maintains her usual level of physical activity, which includes walking.       Immunization History   Administered Date(s) Administered    Covid-19 Vaccine [employer] (J&J) 0.5ml 03/26/2021, 02/10/2022    FLUZONE 6 months and older PFS 0.5  ml (66102) 01/15/2024    Influenza 10/14/2008    Influenza Vaccine, trivalent (IIV3), PF 0.5mL (24148) 10/14/2024    TDAP 01/15/2014       Past Medical History:    Anxiety state    over the past three months getting \"panic attacks\"     Back problem    cramps in lower back \"due to fibroids\"     Migraines    Uterine leiomyoma      Past Surgical History:   Procedure Laterality Date    Colonoscopy N/A 7/7/2025    Procedure: COLONOSCOPY;  Surgeon: Lois Thompson MD;  Location: [facility] MAIN OR    Hysterectomy  08/01/2022    [facility]    Total abdominal hysterectomy N/A 8/1/2022    Procedure: TOTAL ABDOMINAL HYSTERECTOMY-BILATERAL SALPINGECTOMY;  Surgeon: Ana Burgos MD;  Location: [facility] MAIN OR      Social History     Socioeconomic History    Marital status: Life Partner   Tobacco Use    Smoking status: Never    Smokeless tobacco: Never   Vaping Use    Vaping status: Never Used   Substance and Sexual Activity    Alcohol use: Never    Drug use: Never          Review of Systems   Constitutional:  Positive for unexpected weight change (Weight gain). Negative for chills, fatigue and fever.   HENT:  Negative for ear pain, hearing loss, sinus pain, sore throat and trouble swallowing.    Eyes:  Negative for pain and visual disturbance.   Respiratory:  Negative for cough, chest tightness and shortness of breath.    Cardiovascular:  Negative for chest pain, palpitations and leg swelling.   Gastrointestinal:  Negative for abdominal pain, constipation, diarrhea, nausea and vomiting.   Endocrine: Negative for cold intolerance and heat intolerance.   Genitourinary:  Negative for dysuria and hematuria.   Musculoskeletal:  Negative for back pain and joint swelling.   Skin:  Negative for rash.   Allergic/Immunologic: Negative for environmental allergies.   Neurological:  Negative for weakness, numbness and headaches.   Hematological:  Does not bruise/bleed easily.   Psychiatric/Behavioral:  Negative for dysphoric mood and sleep disturbance. The  patient is not nervous/anxious.               Current Outpatient Medications   Medication Sig Dispense Refill    polyethylene glycol, PEG 3350-KCl-NaBcb-NaCl-NaSulf, 236 g Oral Recon Soln Take 4,000 mL by mouth As Directed. Take 2,000 mL the night before your procedure and 2,000 mL the morning of your procedure. Take as directed by GI clinic. Okay to substitute for generic. 1 each 0    acetaminophen 500 MG Oral Tab Take 2 tablets (1,000 mg total) by mouth every 6 (six) hours as needed. 30 tablet 0    ALPRAZolam (XANAX) 0.25 MG Oral Tab Take 1 tablet (0.25 mg total) by mouth 2 (two) times daily as needed. (Patient not taking: Reported on 7/28/2025) 30 tablet 0    rosuvastatin (CRESTOR) 5 MG Oral Tab Take 1 tablet (5 mg total) by mouth daily. (Patient not taking: Reported on 7/28/2025) 90 tablet 1    triamcinolone 0.1 % External Cream Apply topically 2 (two) times daily. (Patient not taking: Reported on 7/28/2025) 45 g 0    ibuprofen 600 MG Oral Tab Take 1 tablet (600 mg total) by mouth every 6 (six) hours. (Patient not taking: Reported on 7/28/2025) 30 tablet 1     Allergies:  Allergies   Allergen Reactions    Corn UNKNOWN    Shrimp UNKNOWN    Wheat UNKNOWN      PHYSICAL EXAM:   Physical Exam  /88 (BP Location: Right arm, Patient Position: Sitting, Cuff Size: adult)   Pulse 69   Temp 97.3 °F (36.3 °C) (Temporal)   Resp 18   Ht 5' 4\" (1.626 m)   Wt 191 lb (86.6 kg)   LMP 07/15/2022   SpO2 98%   BMI 32.79 kg/m²   Wt Readings from Last 2 Encounters:   07/28/25 191 lb (86.6 kg)   06/30/25 184 lb (83.5 kg)     Body mass index is 32.79 kg/m².(2)  Lab Results   Component Value Date    WBC 10.2 09/23/2024    RBC 5.17 09/23/2024    HGB 15.5 09/23/2024    HCT 45.8 09/23/2024    MCV 88.6 09/23/2024    MCH 30.0 09/23/2024    MCHC 33.8 09/23/2024    RDW 12.4 09/23/2024    .0 09/23/2024    MPV 7.8 01/21/2014      Lab Results   Component Value Date    GLU 92 05/05/2025    BUN 11 05/05/2025    BUNCREA 12.9  05/05/2025    CREATSERUM 0.85 05/05/2025    ANIONGAP 9 05/05/2025    GFRNAA 93 02/24/2022    GFRAA 107 02/24/2022    CA 9.4 05/05/2025    OSMOCALC 285 05/05/2025    ALKPHO 71 05/05/2025    AST 14 05/05/2025    ALT 12 05/05/2025    BILT 0.7 05/05/2025    TP 7.6 05/05/2025    ALB 5.1 (H) 05/05/2025    GLOBULIN 2.5 05/05/2025     05/05/2025    K 3.9 05/05/2025     05/05/2025    CO2 25.0 05/05/2025      Lab Results   Component Value Date    EAG 97 07/01/2024    A1C 5.0 07/01/2024      Lab Results   Component Value Date    CHOLEST 132 05/05/2025    TRIG 150 (H) 05/05/2025    HDL 26 (L) 05/05/2025    LDL 79 05/05/2025    VLDL 24 05/05/2025    NONHDLC 106 05/05/2025      Lab Results   Component Value Date    TSH 1.776 07/01/2024                ASSESSMENT/PLAN:     Assessment & Plan      No orders of the defined types were placed in this encounter.      Assessment & Plan  Weight gain  Reports rapid weight gain despite healthy diet and hydration. Blood pressure slightly elevated, possibly related to weight gain.  - Eliminate bread, rice, and pasta from diet.  - Continue walking for exercise.    Hypertension  Blood pressure 138/82 mmHg, slightly elevated. Weight loss may aid management.  - Reassess blood pressure at next visit.  - Encourage weight loss through diet and exercise.    Colonic polyps  Recent colonoscopy showed two benign hyperplastic polyps. Follow-up interval typically three to five years.  - Contact office to determine follow-up interval for colonoscopy.    General Health Maintenance  Recent mammogram normal. Vitamin D levels previously adequate.  - Resume vitamin D 2000 units daily.    Follow-up  Scheduled for full physical examination in November. Plan to assess thyroid function for weight gain issues.  - Schedule full physical examination in November.  - Conduct thyroid test during November visit.       Meds This Visit:  Requested Prescriptions      No prescriptions requested or ordered in this  encounter       Imaging & Referrals:  None         Sophia eMdrano, APRN

## 2025-08-13 ENCOUNTER — OFFICE VISIT (OUTPATIENT)
Dept: OTOLARYNGOLOGY | Facility: CLINIC | Age: 45
End: 2025-08-13

## 2025-08-13 VITALS — HEART RATE: 97 BPM | DIASTOLIC BLOOD PRESSURE: 103 MMHG | SYSTOLIC BLOOD PRESSURE: 166 MMHG

## 2025-08-13 DIAGNOSIS — R09.A2 FOREIGN BODY SENSATION, THROAT: Primary | ICD-10-CM

## 2025-08-13 DIAGNOSIS — J04.0 LARYNGITIS: ICD-10-CM

## 2025-08-13 DIAGNOSIS — J35.1 LINGUAL TONSIL HYPERTROPHY: ICD-10-CM

## 2025-08-13 PROCEDURE — 3077F SYST BP >= 140 MM HG: CPT | Performed by: STUDENT IN AN ORGANIZED HEALTH CARE EDUCATION/TRAINING PROGRAM

## 2025-08-13 PROCEDURE — 3080F DIAST BP >= 90 MM HG: CPT | Performed by: STUDENT IN AN ORGANIZED HEALTH CARE EDUCATION/TRAINING PROGRAM

## 2025-08-13 PROCEDURE — 31575 DIAGNOSTIC LARYNGOSCOPY: CPT | Performed by: STUDENT IN AN ORGANIZED HEALTH CARE EDUCATION/TRAINING PROGRAM

## 2025-08-13 PROCEDURE — 99213 OFFICE O/P EST LOW 20 MIN: CPT | Performed by: STUDENT IN AN ORGANIZED HEALTH CARE EDUCATION/TRAINING PROGRAM

## 2025-08-13 RX ORDER — METHYLPREDNISOLONE 4 MG/1
TABLET ORAL
Qty: 21 TABLET | Refills: 0 | Status: SHIPPED | OUTPATIENT
Start: 2025-08-13

## 2025-08-14 ENCOUNTER — TELEPHONE (OUTPATIENT)
Dept: OTOLARYNGOLOGY | Facility: CLINIC | Age: 45
End: 2025-08-14

## 2025-08-15 ENCOUNTER — MOBILE ENCOUNTER (OUTPATIENT)
Dept: OTOLARYNGOLOGY | Facility: CLINIC | Age: 45
End: 2025-08-15

## 2025-08-15 ENCOUNTER — TELEPHONE (OUTPATIENT)
Dept: OTOLARYNGOLOGY | Facility: CLINIC | Age: 45
End: 2025-08-15

## 2025-08-15 DIAGNOSIS — R09.A2 FOREIGN BODY SENSATION, THROAT: Primary | ICD-10-CM

## 2025-08-18 ENCOUNTER — OFFICE VISIT (OUTPATIENT)
Dept: INTERNAL MEDICINE CLINIC | Facility: CLINIC | Age: 45
End: 2025-08-18

## 2025-08-18 ENCOUNTER — LAB ENCOUNTER (OUTPATIENT)
Dept: LAB | Age: 45
End: 2025-08-18
Attending: NURSE PRACTITIONER

## 2025-08-18 VITALS
BODY MASS INDEX: 30.87 KG/M2 | DIASTOLIC BLOOD PRESSURE: 84 MMHG | SYSTOLIC BLOOD PRESSURE: 128 MMHG | OXYGEN SATURATION: 100 % | HEIGHT: 64 IN | WEIGHT: 180.81 LBS | TEMPERATURE: 98 F | HEART RATE: 65 BPM

## 2025-08-18 DIAGNOSIS — M85.80 HYPEROSTOSIS: Primary | ICD-10-CM

## 2025-08-18 DIAGNOSIS — Z13.29 SCREENING FOR THYROID DISORDER: ICD-10-CM

## 2025-08-18 LAB
T3FREE SERPL-MCNC: 3.31 PG/ML (ref 2.4–4.2)
T4 FREE SERPL-MCNC: 1.7 NG/DL (ref 0.8–1.7)
TSI SER-ACNC: 0.83 UIU/ML (ref 0.55–4.78)

## 2025-08-18 PROCEDURE — 84439 ASSAY OF FREE THYROXINE: CPT

## 2025-08-18 PROCEDURE — 99214 OFFICE O/P EST MOD 30 MIN: CPT | Performed by: NURSE PRACTITIONER

## 2025-08-18 PROCEDURE — 84443 ASSAY THYROID STIM HORMONE: CPT

## 2025-08-18 PROCEDURE — 3079F DIAST BP 80-89 MM HG: CPT | Performed by: NURSE PRACTITIONER

## 2025-08-18 PROCEDURE — 84481 FREE ASSAY (FT-3): CPT

## 2025-08-18 PROCEDURE — 3008F BODY MASS INDEX DOCD: CPT | Performed by: NURSE PRACTITIONER

## 2025-08-18 PROCEDURE — 36415 COLL VENOUS BLD VENIPUNCTURE: CPT

## 2025-08-18 PROCEDURE — 3074F SYST BP LT 130 MM HG: CPT | Performed by: NURSE PRACTITIONER

## 2025-08-18 RX ORDER — IBUPROFEN 100 MG/5ML
200 SUSPENSION ORAL EVERY 6 HOURS PRN
Qty: 1 EACH | Refills: 0 | Status: SHIPPED | OUTPATIENT
Start: 2025-08-18 | End: 2025-08-25

## 2025-08-21 ENCOUNTER — HOSPITAL ENCOUNTER (OUTPATIENT)
Dept: CT IMAGING | Facility: HOSPITAL | Age: 45
Discharge: HOME | End: 2025-08-21
Attending: STUDENT IN AN ORGANIZED HEALTH CARE EDUCATION/TRAINING PROGRAM

## 2025-08-21 ENCOUNTER — TELEPHONE (OUTPATIENT)
Dept: INTERNAL MEDICINE CLINIC | Facility: CLINIC | Age: 45
End: 2025-08-21

## 2025-08-21 DIAGNOSIS — R09.A2 FOREIGN BODY SENSATION, THROAT: ICD-10-CM

## 2025-08-21 LAB
CREAT BLD-MCNC: 0.9 MG/DL (ref 0.55–1.02)
EGFRCR SERPLBLD CKD-EPI 2021: 80 ML/MIN/1.73M2 (ref 60–?)

## 2025-08-21 PROCEDURE — 82565 ASSAY OF CREATININE: CPT

## 2025-08-21 PROCEDURE — 70491 CT SOFT TISSUE NECK W/DYE: CPT | Performed by: STUDENT IN AN ORGANIZED HEALTH CARE EDUCATION/TRAINING PROGRAM

## 2025-08-25 ENCOUNTER — TELEPHONE (OUTPATIENT)
Dept: OTOLARYNGOLOGY | Facility: CLINIC | Age: 45
End: 2025-08-25

## 2025-08-25 DIAGNOSIS — E04.1 THYROID NODULE: Primary | ICD-10-CM

## (undated) DEVICE — UNDYED BRAIDED (POLYGLACTIN 910), SYNTHETIC ABSORBABLE SUTURE: Brand: COATED VICRYL

## (undated) DEVICE — 3M™ MEDITPORE™ SOFT CLOTH TAPE 6 IN X 10 YD 12 ROLLS/CASE 2966: Brand: 3M™ MEDIPORE™

## (undated) DEVICE — 3M™ STERI-STRIP™ REINFORCED ADHESIVE SKIN CLOSURES, R1547, 1/2 IN X 4 IN (12 MM X 100 MM), 6 STRIPS/ENVELOPE: Brand: 3M™ STERI-STRIP™

## (undated) DEVICE — SUT VICRYL 0 CT-1 JJ31G

## (undated) DEVICE — 3M™ STERI-STRIP™ COMPOUND BENZOIN TINCTURE 40 BAGS/CARTON 4 CARTONS/CASE C1544: Brand: 3M™ STERI-STRIP™

## (undated) DEVICE — GAMMEX® PI HYBRID SIZE 6.5, STERILE POWDER-FREE SURGICAL GLOVE, POLYISOPRENE AND NEOPRENE BLEND: Brand: GAMMEX

## (undated) DEVICE — LAPAROTOMY: Brand: MEDLINE INDUSTRIES, INC.

## (undated) DEVICE — CONTAINER GENT-L-KARE 4OZ GRAY

## (undated) DEVICE — SUT VICRYL 0 CT-1 J340H

## (undated) DEVICE — GAMMEX® PI HYBRID SIZE 6, STERILE POWDER-FREE SURGICAL GLOVE, POLYISOPRENE AND NEOPRENE BLEND: Brand: GAMMEX

## (undated) DEVICE — WATER STERILE AQUALITE 1000ML

## (undated) DEVICE — APPLICATOR CHLORAPREP 26ML

## (undated) DEVICE — SUT PLAIN GUT 3-0 CT-1 842H

## (undated) DEVICE — VIOLET BRAIDED (POLYGLACTIN 910), SYNTHETIC ABSORBABLE SUTURE: Brand: COATED VICRYL

## (undated) NOTE — LETTER
September 30, 2021     Luciano Umana  [address]      You are in need of a follow up for mammogram    Orders are placed, please call central scheduling for appointment.     NINI Bray

## (undated) NOTE — LETTER
August 21, 2019     [facility]unice Moreno  [facility] [address] [place] [id number]      Dear Perlita Gonzalez:    Below are the results from your recent visit:    Resulted Orders   COMP METABOLIC PANEL (14)   Result Value Ref Range    Glucose 90 70 - 99 mg/dL    Sodium MCH 27.2 26.0 - 34.0 pg    MCHC 32.0 31.0 - 37.0 g/dL    RDW-SD 44.9 35.1 - 46.3 fL    RDW 14.6 11.0 - 15.0 %    .0 150.0 - 450.0 10(3)uL    Neutrophil Absolute Prelim 4.85 1.50 - 7.70 x10 (3) uL    Neutrophil Absolute 4.85 1.50 - 7.70 x10(3) uL

## (undated) NOTE — LETTER
10/12/2019              Blanco Due        [address] [facility]         Dear [place],    It was a pleasure to see you. Your most recent Pap Smear and HPV were negative.  Please make sure you call to make an appointment for an helga

## (undated) NOTE — LETTER
Jack Farrar Md  [address]  [place] [address]       01/16/24        Patient: Stacie Milian   YOB: 1980   Date of Visit: 1/16/2024       Dear  Dr. Charan MD,      Thank you for referring Stacie Milian to my practice.  Please find my assessment and plan below.        ASSESSMENT AND PLAN    1. Hearing loss, unspecified hearing loss type, unspecified laterality  - Audiology Referral - [place] ([facility])    2. Perforation of left tympanic membrane  Plan somewhat small perforation of the tympanic membrane on the left.  No drainage no infection we did discuss water protection and return to see me in 1 month for repeat audiogram.  If no improvement will have her meet with Dr. STEPHEN to discuss possible surgical repair for perforation.  We discussed the fact that she has a significant high-frequency hearing loss which is severe in nature and unrelated to this injury as it is in both ears and we did discuss fact that she would most likely benefit from hearing aids in the future.  Return to see me in 1 month with repeat audio               Sincerely,   Bunny Mclaughlin MD   [facility]  [address]  [facility] [address]    Document electronically generated by:  Bunny Mclaughlin MD

## (undated) NOTE — MR AVS SNAPSHOT
After Visit Summary   10/4/2019    Lorene Moctezuma    MRN: [id number]           Visit Information     Date & Time  10/4/2019  1:20 PM Provider  Oseas Krishnamurthy MD [address] [place] [address], [address] [place] [address],[address], [facility]  Phone It is the patient's responsibility to check with and follow their insurance company's guidelines for prior authorization for this test.  You may be held responsible for payment in full if proper authorization is not acquired.   Please contact the Patient Bu 7. Choose a Security Question and enter your Answer and click Next. This can be used at a later time if you forget your password. 8. Enter your e-mail address. You will receive e-mail notification when new information is available in [address]   9. Click S experience and are looking for ways to make improvements. Your feedback will help us do so. For more information on [employer], please visit [email or web address]                   DO YOU KNOW WHERE TO GO?   Injury & illness are neve

## (undated) NOTE — LETTER
9/1/2022              Stacie Milian        [address]        [facility] [address]           To whom it may concern,        Bisi Domínguez may return to work without restrictions on 9/12/22.           Sincerely,          Sara Mackay MD  [address] [place] [address]  [phone]        Document electronically generated by:  Sara Mackay MD